# Patient Record
Sex: MALE | Race: WHITE | NOT HISPANIC OR LATINO | URBAN - METROPOLITAN AREA
[De-identification: names, ages, dates, MRNs, and addresses within clinical notes are randomized per-mention and may not be internally consistent; named-entity substitution may affect disease eponyms.]

---

## 2022-05-02 ENCOUNTER — HOSPITAL ENCOUNTER (OUTPATIENT)
Dept: PREADMISSION TESTING | Facility: OTHER | Age: 62
Discharge: HOME | End: 2022-05-02
Attending: ORTHOPAEDIC SURGERY
Payer: COMMERCIAL

## 2022-05-02 ENCOUNTER — ANESTHESIA EVENT (OUTPATIENT)
Dept: SURGERY | Facility: OTHER | Age: 62
End: 2022-05-02
Payer: COMMERCIAL

## 2022-05-02 VITALS
OXYGEN SATURATION: 95 % | WEIGHT: 227 LBS | TEMPERATURE: 98 F | HEART RATE: 62 BPM | DIASTOLIC BLOOD PRESSURE: 74 MMHG | BODY MASS INDEX: 32.5 KG/M2 | HEIGHT: 70 IN | SYSTOLIC BLOOD PRESSURE: 122 MMHG

## 2022-05-02 DIAGNOSIS — Z01.818 PREOP TESTING: Primary | ICD-10-CM

## 2022-05-02 LAB
ANION GAP SERPL CALC-SCNC: 13 MMOL/L (ref 8–16)
BUN SERPL-MCNC: 11 MG/DL (ref 6–20)
CALCIUM SERPL-MCNC: 9.7 MG/DL (ref 8.7–10.5)
CHLORIDE SERPL-SCNC: 102 MMOL/L (ref 95–110)
CO2 SERPL-SCNC: 25 MMOL/L (ref 23–29)
CREAT SERPL-MCNC: 0.9 MG/DL (ref 0.5–1.4)
EST. GFR  (AFRICAN AMERICAN): >60 ML/MIN/1.73 M^2
EST. GFR  (NON AFRICAN AMERICAN): >60 ML/MIN/1.73 M^2
GLUCOSE SERPL-MCNC: 91 MG/DL (ref 70–110)
POTASSIUM SERPL-SCNC: 4.4 MMOL/L (ref 3.5–5.1)
SODIUM SERPL-SCNC: 140 MMOL/L (ref 136–145)

## 2022-05-02 PROCEDURE — 93005 ELECTROCARDIOGRAM TRACING: CPT

## 2022-05-02 PROCEDURE — 80048 BASIC METABOLIC PNL TOTAL CA: CPT | Performed by: ANESTHESIOLOGY

## 2022-05-02 PROCEDURE — 36415 COLL VENOUS BLD VENIPUNCTURE: CPT | Performed by: ANESTHESIOLOGY

## 2022-05-02 PROCEDURE — 93010 ELECTROCARDIOGRAM REPORT: CPT | Mod: ,,, | Performed by: INTERNAL MEDICINE

## 2022-05-02 PROCEDURE — 93010 EKG 12-LEAD: ICD-10-PCS | Mod: ,,, | Performed by: INTERNAL MEDICINE

## 2022-05-02 RX ORDER — ATORVASTATIN CALCIUM 10 MG/1
10 TABLET, FILM COATED ORAL DAILY
COMMUNITY

## 2022-05-02 RX ORDER — ESZOPICLONE 3 MG/1
3 TABLET, FILM COATED ORAL NIGHTLY
COMMUNITY

## 2022-05-02 RX ORDER — SODIUM CHLORIDE, SODIUM LACTATE, POTASSIUM CHLORIDE, CALCIUM CHLORIDE 600; 310; 30; 20 MG/100ML; MG/100ML; MG/100ML; MG/100ML
INJECTION, SOLUTION INTRAVENOUS CONTINUOUS
Status: CANCELLED | OUTPATIENT
Start: 2022-05-02

## 2022-05-02 RX ORDER — LIDOCAINE HYDROCHLORIDE 10 MG/ML
0.5 INJECTION, SOLUTION EPIDURAL; INFILTRATION; INTRACAUDAL; PERINEURAL ONCE
Status: CANCELLED | OUTPATIENT
Start: 2022-05-02 | End: 2022-05-02

## 2022-05-02 RX ORDER — ACETAMINOPHEN 500 MG
1000 TABLET ORAL
Status: CANCELLED | OUTPATIENT
Start: 2022-05-02 | End: 2022-05-02

## 2022-05-02 NOTE — DISCHARGE INSTRUCTIONS
Information to Prepare you for your Surgery    PRE-ADMIT TESTING -  [phone]    [facility]          Your surgery has been scheduled at [facility]. We are pleased to have the opportunity to serve you. For Further Information please call [phone].    On the day of surgery please report to the Information Desk on the 1st floor.    CONTACT YOUR PHYSICIAN'S OFFICE THE DAY PRIOR TO YOUR SURGERY TO OBTAIN YOUR ARRIVAL TIME.     The evening before surgery do not eat anything after 9 p.m. ( this includes hard candy, chewing gum and mints).  You may only have GATORADE, POWERADE AND WATER  from 9 p.m. until you leave your home.   DO NOT DRINK ANY LIQUIDS ON THE WAY TO THE HOSPITAL.      Why does your anesthesiologist allow you to drink Gatorade/Powerade before surgery?  Gatorade/Powerade helps to increase your comfort before surgery and to decrease your nausea after surgery. The carbohydrates in Gatorade/Powerade help reduce your body's stress response to surgery.  If you are a diabetic-drink only water prior to surgery.      Current Visitor policy(12/27/2021) - Patients may have 2 visitors pre and post procedure. Only 2 visitors will be allowed in the Surgical building with the patient.     SPECIAL MEDICATION INSTRUCTIONS: TAKE medications checked off by the Anesthesiologist on your Medication List.    Angiogram Patients: Take medications as instructed by your physician, including aspirin.     Surgery Patients:    If you take ASPIRIN - Your PHYSICIAN/SURGEON will need to inform you IF/OR when you need to stop taking aspirin prior to your surgery.     Do Not take any medications containing IBUPROFEN.    Do Not Wear any make-up (especially eye make-up) to surgery. Please remove any false eyelashes or eyelash extensions. If you arrive the day of surgery with makeup/eyelashes on you will be required to remove prior to surgery. (There is a risk of corneal  abrasions if eye makeup/eyelash extensions are not removed)      Leave all valuables at home.   Do Not wear any jewelry or watches, including any metal in body piercings. Jewelry must be removed prior to coming to the hospital.  There is a possibility that rings that are unable to be removed may be cut off if they are on the surgical extremity.    Please remove all hair extensions, wigs, clips and any other metal accessories/ ornaments from your hair.  These items may pose a flammable/fire risk in Surgery and must be removed.    Do not shave your surgical area at least 5 days prior to your surgery. The surgical prep will be performed at the hospital according to Infection Control regulations.    Contact Lens must be removed before surgery. Either do not wear the contact lens or bring a case and solution for storage.  Please bring a container for eyeglasses or dentures as required.  Bring any paperwork your physician has provided, such as consent forms,  history and physicals, doctor's orders, etc.   Bring comfortable clothes that are loose fitting to wear upon discharge. Take into consideration the type of surgery being performed.  Maintain your diet as advised per your physician the day prior to surgery.      Adequate rest the night before surgery is advised.   Park in the Parking lot behind the hospital or in the [facility] across the street from the parking lot. Parking is complimentary.  If you will be discharged the same day as your procedure, please arrange for a responsible adult to drive you home or to accompany you if traveling by taxi.   YOU WILL NOT BE PERMITTED TO DRIVE OR TO LEAVE THE HOSPITAL ALONE AFTER SURGERY.   If you are being discharged the same day, it is strongly recommended that you arrange for someone to remain with you for the first 24 hrs following your surgery.    The Surgeon will speak to your family/visitor after your surgery regarding the outcome of your surgery and post op  care.  The Surgeon may speak to you after your surgery, but there is a possibility you may not remember the details.  Please check with your family members regarding the conversation with the Surgeon.    We strongly recommend whoever is bringing you home be present for discharge instructions.  This will ensure a thorough understanding for your post op home care.    ALL CHILDREN MUST ALWAYS BE ACCOMPANIED BY AN ADULT.    Visitors-Refer to current Visitor policy handouts.    Thank you for your cooperation.  The Staff of [facility].            Bathing Instructions with Hibiclens    Shower the evening before and morning of your procedure with Hibiclens:  Wash your face with water and your regular face wash/soap  Apply Hibiclens directly on your skin or on a wet washcloth and wash gently. When showering: Move away from the shower stream when applying Hibiclens to avoid rinsing off too soon.  Rinse thoroughly with warm water  Do not dilute Hibiclens        Dry off as usual, do not use any deodorant, powder, body lotions, perfume, after shave or cologne.

## 2022-05-02 NOTE — ANESTHESIA PREPROCEDURE EVALUATION
05/02/2022  Miguel Glover is a 61 y.o., male.      Pre-op Assessment    I have reviewed the Patient Summary Reports.     I have reviewed the Nursing Notes. I have reviewed the NPO Status.   I have reviewed the Medications.     Review of Systems  Anesthesia Hx:  Denies Family Hx of Anesthesia complications.  Personal Hx of Anesthesia complications Slow To Awaken/Delayed Emergence (ICU admit after colonoscopy, had severe bradycardia. Has had pacemaker placed since this episode)   Social:  Non-Smoker    Hematology/Oncology:  Hematology Normal   Oncology Normal   Oncology Comments: Skin CA     Cardiovascular:   Pacemaker Dysrhythmias    Pulmonary:  Pulmonary Normal    Renal/:  Renal/ Normal     Hepatic/GI:  Hepatic/GI Normal    Neurological:   CVA, residual symptoms    Endocrine:  Endocrine Normal        Physical Exam  General: Cooperative, Alert and Oriented    Airway:  Mallampati: II   Mouth Opening: Normal  TM Distance: Normal  Tongue: Normal  Neck ROM: Normal ROM    Dental:  Intact        Anesthesia Plan  Type of Anesthesia, risks & benefits discussed:    Anesthesia Type: Gen ETT  Intra-op Monitoring Plan: Standard ASA Monitors  Post Op Pain Control Plan: peripheral nerve block and multimodal analgesia  Induction:  IV  Airway Plan: Video, Post-Induction  Informed Consent: Informed consent signed with the Patient and all parties understand the risks and agree with anesthesia plan.  All questions answered.   ASA Score: 3  Anesthesia Plan Notes: BMP, EKG  Will have pacer reprogrammed on am of surgery    Ready For Surgery From Anesthesia Perspective.     .

## 2022-05-12 ENCOUNTER — HOSPITAL ENCOUNTER (OUTPATIENT)
Facility: OTHER | Age: 62
Discharge: HOME | End: 2022-05-12
Attending: ORTHOPAEDIC SURGERY | Admitting: ORTHOPAEDIC SURGERY
Payer: COMMERCIAL

## 2022-05-12 ENCOUNTER — ANESTHESIA (OUTPATIENT)
Dept: SURGERY | Facility: OTHER | Age: 62
End: 2022-05-12
Payer: COMMERCIAL

## 2022-05-12 DIAGNOSIS — S46.011A TRAUMATIC COMPLETE TEAR OF RIGHT ROTATOR CUFF, INITIAL ENCOUNTER: ICD-10-CM

## 2022-05-12 DIAGNOSIS — M75.41 IMPINGEMENT SYNDROME OF RIGHT SHOULDER: Primary | ICD-10-CM

## 2022-05-12 PROBLEM — M75.121 COMPLETE TEAR OF RIGHT ROTATOR CUFF: Status: ACTIVE | Noted: 2022-05-12

## 2022-05-12 PROBLEM — S43.431A SLAP LESION OF RIGHT SHOULDER: Status: ACTIVE | Noted: 2022-05-12

## 2022-05-12 PROBLEM — M25.511 RIGHT SHOULDER PAIN: Status: ACTIVE | Noted: 2022-05-12

## 2022-05-12 PROCEDURE — 37000008 HC ANESTHESIA 1ST 15 MINUTES: Performed by: ORTHOPAEDIC SURGERY

## 2022-05-12 PROCEDURE — 25000003 PHARM REV CODE 250: Performed by: ANESTHESIOLOGY

## 2022-05-12 PROCEDURE — 71000033 HC RECOVERY, INTIAL HOUR: Performed by: ORTHOPAEDIC SURGERY

## 2022-05-12 PROCEDURE — 36000710: Performed by: ORTHOPAEDIC SURGERY

## 2022-05-12 PROCEDURE — 71000015 HC POSTOP RECOV 1ST HR: Performed by: ORTHOPAEDIC SURGERY

## 2022-05-12 PROCEDURE — 71000039 HC RECOVERY, EACH ADD'L HOUR: Performed by: ORTHOPAEDIC SURGERY

## 2022-05-12 PROCEDURE — 37000009 HC ANESTHESIA EA ADD 15 MINS: Performed by: ORTHOPAEDIC SURGERY

## 2022-05-12 PROCEDURE — 64415 NJX AA&/STRD BRCH PLXS IMG: CPT | Performed by: ANESTHESIOLOGY

## 2022-05-12 PROCEDURE — 25000003 PHARM REV CODE 250: Performed by: NURSE ANESTHETIST, CERTIFIED REGISTERED

## 2022-05-12 PROCEDURE — 27201423 OPTIME MED/SURG SUP & DEVICES STERILE SUPPLY: Performed by: ORTHOPAEDIC SURGERY

## 2022-05-12 PROCEDURE — 25000003 PHARM REV CODE 250

## 2022-05-12 PROCEDURE — 63600175 PHARM REV CODE 636 W HCPCS: Performed by: ORTHOPAEDIC SURGERY

## 2022-05-12 PROCEDURE — 63600175 PHARM REV CODE 636 W HCPCS: Performed by: NURSE ANESTHETIST, CERTIFIED REGISTERED

## 2022-05-12 PROCEDURE — 63600175 PHARM REV CODE 636 W HCPCS: Performed by: ANESTHESIOLOGY

## 2022-05-12 PROCEDURE — C1713 ANCHOR/SCREW BN/BN,TIS/BN: HCPCS | Performed by: ORTHOPAEDIC SURGERY

## 2022-05-12 PROCEDURE — 25000003 PHARM REV CODE 250: Performed by: ORTHOPAEDIC SURGERY

## 2022-05-12 PROCEDURE — 71000016 HC POSTOP RECOV ADDL HR: Performed by: ORTHOPAEDIC SURGERY

## 2022-05-12 PROCEDURE — 36000711: Performed by: ORTHOPAEDIC SURGERY

## 2022-05-12 DEVICE — ANCHOR BIOCOMP SWVLLOK: Type: IMPLANTABLE DEVICE | Site: SHOULDER | Status: FUNCTIONAL

## 2022-05-12 DEVICE — ANCHOR SUT FIBERTAK 1.8 KNTLS: Type: IMPLANTABLE DEVICE | Site: SHOULDER | Status: FUNCTIONAL

## 2022-05-12 DEVICE — ANCHOR FIBERTAK SOFT 2.6: Type: IMPLANTABLE DEVICE | Site: SHOULDER | Status: FUNCTIONAL

## 2022-05-12 RX ORDER — MIDAZOLAM HYDROCHLORIDE 1 MG/ML
INJECTION INTRAMUSCULAR; INTRAVENOUS
Status: DISCONTINUED | OUTPATIENT
Start: 2022-05-12 | End: 2022-05-12

## 2022-05-12 RX ORDER — DIPHENHYDRAMINE HYDROCHLORIDE 50 MG/ML
25 INJECTION INTRAMUSCULAR; INTRAVENOUS EVERY 6 HOURS PRN
Status: DISCONTINUED | OUTPATIENT
Start: 2022-05-12 | End: 2022-05-12 | Stop reason: HOSPADM

## 2022-05-12 RX ORDER — ROPIVACAINE HYDROCHLORIDE 5 MG/ML
INJECTION, SOLUTION EPIDURAL; INFILTRATION; PERINEURAL
Status: COMPLETED | OUTPATIENT
Start: 2022-05-12 | End: 2022-05-12

## 2022-05-12 RX ORDER — EPINEPHRINE 1 MG/ML
INJECTION, SOLUTION INTRACARDIAC; INTRAMUSCULAR; INTRAVENOUS; SUBCUTANEOUS
Status: DISCONTINUED | OUTPATIENT
Start: 2022-05-12 | End: 2022-05-12 | Stop reason: HOSPADM

## 2022-05-12 RX ORDER — OXYCODONE HYDROCHLORIDE 5 MG/1
5 TABLET ORAL
Status: DISCONTINUED | OUTPATIENT
Start: 2022-05-12 | End: 2022-05-12 | Stop reason: HOSPADM

## 2022-05-12 RX ORDER — CLINDAMYCIN PHOSPHATE 900 MG/50ML
900 INJECTION, SOLUTION INTRAVENOUS ONCE
Status: COMPLETED | OUTPATIENT
Start: 2022-05-12 | End: 2022-05-12

## 2022-05-12 RX ORDER — HYDROCODONE BITARTRATE AND ACETAMINOPHEN 10; 325 MG/1; MG/1
1 TABLET ORAL
Qty: 40 TABLET | Refills: 0 | Status: SHIPPED | OUTPATIENT
Start: 2022-05-12 | End: 2022-09-16 | Stop reason: ALTCHOICE

## 2022-05-12 RX ORDER — MEPERIDINE HYDROCHLORIDE 25 MG/ML
12.5 INJECTION INTRAMUSCULAR; INTRAVENOUS; SUBCUTANEOUS ONCE AS NEEDED
Status: DISCONTINUED | OUTPATIENT
Start: 2022-05-12 | End: 2022-05-12 | Stop reason: HOSPADM

## 2022-05-12 RX ORDER — SODIUM CHLORIDE, SODIUM LACTATE, POTASSIUM CHLORIDE, CALCIUM CHLORIDE 600; 310; 30; 20 MG/100ML; MG/100ML; MG/100ML; MG/100ML
INJECTION, SOLUTION INTRAVENOUS CONTINUOUS
Status: DISCONTINUED | OUTPATIENT
Start: 2022-05-12 | End: 2022-05-12 | Stop reason: HOSPADM

## 2022-05-12 RX ORDER — FENTANYL CITRATE 50 UG/ML
INJECTION, SOLUTION INTRAMUSCULAR; INTRAVENOUS
Status: DISCONTINUED | OUTPATIENT
Start: 2022-05-12 | End: 2022-05-12

## 2022-05-12 RX ORDER — ROCURONIUM BROMIDE 10 MG/ML
INJECTION, SOLUTION INTRAVENOUS
Status: DISCONTINUED | OUTPATIENT
Start: 2022-05-12 | End: 2022-05-12

## 2022-05-12 RX ORDER — LIDOCAINE HYDROCHLORIDE 20 MG/ML
INJECTION INTRAVENOUS
Status: DISCONTINUED | OUTPATIENT
Start: 2022-05-12 | End: 2022-05-12

## 2022-05-12 RX ORDER — HYDROMORPHONE HYDROCHLORIDE 2 MG/ML
0.4 INJECTION, SOLUTION INTRAMUSCULAR; INTRAVENOUS; SUBCUTANEOUS EVERY 5 MIN PRN
Status: DISCONTINUED | OUTPATIENT
Start: 2022-05-12 | End: 2022-05-12 | Stop reason: HOSPADM

## 2022-05-12 RX ORDER — PROPOFOL 10 MG/ML
VIAL (ML) INTRAVENOUS
Status: DISCONTINUED | OUTPATIENT
Start: 2022-05-12 | End: 2022-05-12

## 2022-05-12 RX ORDER — ONDANSETRON 4 MG/1
8 TABLET, ORALLY DISINTEGRATING ORAL EVERY 8 HOURS PRN
Qty: 10 TABLET | Refills: 1 | Status: SHIPPED | OUTPATIENT
Start: 2022-05-12 | End: 2023-05-16 | Stop reason: CLARIF

## 2022-05-12 RX ORDER — PHENYLEPHRINE HYDROCHLORIDE 10 MG/ML
INJECTION INTRAVENOUS
Status: DISCONTINUED | OUTPATIENT
Start: 2022-05-12 | End: 2022-05-12

## 2022-05-12 RX ORDER — SODIUM CHLORIDE 0.9 % (FLUSH) 0.9 %
3 SYRINGE (ML) INJECTION
Status: DISCONTINUED | OUTPATIENT
Start: 2022-05-12 | End: 2022-05-12 | Stop reason: HOSPADM

## 2022-05-12 RX ORDER — ACETAMINOPHEN 500 MG
1000 TABLET ORAL
Status: COMPLETED | OUTPATIENT
Start: 2022-05-12 | End: 2022-05-12

## 2022-05-12 RX ORDER — PROCHLORPERAZINE EDISYLATE 5 MG/ML
5 INJECTION INTRAMUSCULAR; INTRAVENOUS EVERY 30 MIN PRN
Status: DISCONTINUED | OUTPATIENT
Start: 2022-05-12 | End: 2022-05-12 | Stop reason: HOSPADM

## 2022-05-12 RX ORDER — DEXAMETHASONE SODIUM PHOSPHATE 4 MG/ML
INJECTION, SOLUTION INTRA-ARTICULAR; INTRALESIONAL; INTRAMUSCULAR; INTRAVENOUS; SOFT TISSUE
Status: DISCONTINUED | OUTPATIENT
Start: 2022-05-12 | End: 2022-05-12

## 2022-05-12 RX ORDER — ONDANSETRON 2 MG/ML
INJECTION INTRAMUSCULAR; INTRAVENOUS
Status: DISCONTINUED | OUTPATIENT
Start: 2022-05-12 | End: 2022-05-12

## 2022-05-12 RX ORDER — LIDOCAINE HYDROCHLORIDE 10 MG/ML
0.5 INJECTION, SOLUTION EPIDURAL; INFILTRATION; INTRACAUDAL; PERINEURAL ONCE
Status: DISCONTINUED | OUTPATIENT
Start: 2022-05-12 | End: 2022-05-12 | Stop reason: HOSPADM

## 2022-05-12 RX ADMIN — FENTANYL CITRATE 100 MCG: 50 INJECTION, SOLUTION INTRAMUSCULAR; INTRAVENOUS at 06:05

## 2022-05-12 RX ADMIN — LIDOCAINE HYDROCHLORIDE 50 MG: 20 INJECTION, SOLUTION INTRAVENOUS at 06:05

## 2022-05-12 RX ADMIN — PROPOFOL 200 MG: 10 INJECTION, EMULSION INTRAVENOUS at 06:05

## 2022-05-12 RX ADMIN — GLYCOPYRROLATE 0.2 MG: 0.2 INJECTION, SOLUTION INTRAMUSCULAR; INTRAVITREAL at 07:05

## 2022-05-12 RX ADMIN — ROCURONIUM BROMIDE 50 MG: 10 INJECTION, SOLUTION INTRAVENOUS at 06:05

## 2022-05-12 RX ADMIN — CLINDAMYCIN PHOSPHATE 900 MG: 18 INJECTION, SOLUTION INTRAVENOUS at 07:05

## 2022-05-12 RX ADMIN — SODIUM CHLORIDE, SODIUM LACTATE, POTASSIUM CHLORIDE, AND CALCIUM CHLORIDE: 600; 310; 30; 20 INJECTION, SOLUTION INTRAVENOUS at 07:05

## 2022-05-12 RX ADMIN — MIDAZOLAM HYDROCHLORIDE 2 MG: 1 INJECTION, SOLUTION INTRAMUSCULAR; INTRAVENOUS at 06:05

## 2022-05-12 RX ADMIN — PHENYLEPHRINE HYDROCHLORIDE 100 MCG: 10 INJECTION INTRAVENOUS at 07:05

## 2022-05-12 RX ADMIN — ONDANSETRON HYDROCHLORIDE 4 MG: 2 INJECTION INTRAMUSCULAR; INTRAVENOUS at 07:05

## 2022-05-12 RX ADMIN — SUGAMMADEX 200 MG: 100 INJECTION, SOLUTION INTRAVENOUS at 08:05

## 2022-05-12 RX ADMIN — ACETAMINOPHEN 1000 MG: 500 TABLET, FILM COATED ORAL at 06:05

## 2022-05-12 RX ADMIN — DEXAMETHASONE SODIUM PHOSPHATE 8 MG: 4 INJECTION, SOLUTION INTRAMUSCULAR; INTRAVENOUS at 07:05

## 2022-05-12 RX ADMIN — SODIUM CHLORIDE, SODIUM LACTATE, POTASSIUM CHLORIDE, AND CALCIUM CHLORIDE: 600; 310; 30; 20 INJECTION, SOLUTION INTRAVENOUS at 06:05

## 2022-05-12 RX ADMIN — ROPIVACAINE HYDROCHLORIDE 30 ML: 5 INJECTION, SOLUTION EPIDURAL; INFILTRATION; PERINEURAL at 06:05

## 2022-05-12 NOTE — DISCHARGE INSTRUCTIONS
Polar Care Cube Instructions            Shoulder Arthroscopy Post-Op Instructions                                       Dr. Jcarlos Steele    1. Sling/Brace- You should wear the brace until the first post-op visit. You can take the sling off to shower but keep your arm at your side.  Do not lift your arm away from your body unless told otherwise.  I will give you/your family specific instructions about the length of brace wear.    2. Bandage- If you have physical therapy set up they will remove the bandage. Otherwise you can remove it in 3 days. Keep the incisions clean, dry and covered. Do not get it wet until you see me.     3. Ice- Use the polar care as much as you want. Make sure there are clothes or a towel between the cooling unit and your skin.     4. Exercise- If you have PT set up, they will instruct you on exercises to do. If you do not, it is OK to lean your arm over and make circles with your hand pointing to the floor (called Pendulum exercises). Do not lift or grasp anything away from the body until you see me. It is OK to take your arm out of the sling and extend your elbow as long as your elbow is at your side.     5. Medications- You will be given pain and nausea prescriptions to go home. Take the medications as written.  Call the office if you are running low with at least 2-3 days notice to give us time to refill it.  We also recommend taking a baby aspirin for 2 weeks after surgery to decrease the (very,very low) risk of blood clot formation.    6. Bathing- Do not get your shoulder wet until you see me in clinic.    7. Appointment- You should already have your post-op appointment scheduled. If you do not or you can't remember, call the office for more information.

## 2022-05-12 NOTE — TRANSFER OF CARE
"Anesthesia Transfer of Care Note    Patient: Miguel Glover    Procedure(s) Performed: Procedure(s) (LRB):  REPAIR, ROTATOR CUFF, ARTHROSCOPIC (Right)  ARTHROSCOPY, SHOULDER, WITH SUBACROMIAL SPACE DECOMPRESSION (Right)  TENOTOMY, BICEPS, ARTHROSCOPIC (Right)  ARTHROSCOPY, SHOULDER, WITH SLAP REPAIR (Right)    Patient location: PACU    Anesthesia Type: general    Transport from OR: Transported from OR on 6-10 L/min O2 by face mask with adequate spontaneous ventilation    Post pain: adequate analgesia    Post assessment: no apparent anesthetic complications    Post vital signs: stable    Level of consciousness: awake    Nausea/Vomiting: no nausea/vomiting    Complications: none    Transfer of care protocol was followed      Last vitals:   Visit Vitals  /73 (BP Location: Left arm, Patient Position: Lying)   Pulse 78   Temp 36.4 °C (97.6 °F) (Oral)   Resp 16   Ht 5' 10" (1.778 m)   Wt 103 kg (227 lb)   SpO2 96%   BMI 32.57 kg/m²     "

## 2022-05-12 NOTE — ANESTHESIA POSTPROCEDURE EVALUATION
Anesthesia Post Evaluation    Patient: Miguel Glover    Procedure(s) Performed: Procedure(s) (LRB):  REPAIR, ROTATOR CUFF, ARTHROSCOPIC (Right)  ARTHROSCOPY, SHOULDER, WITH SUBACROMIAL SPACE DECOMPRESSION (Right)  TENOTOMY, BICEPS, ARTHROSCOPIC (Right)  ARTHROSCOPY, SHOULDER, WITH SLAP REPAIR (Right)    Final Anesthesia Type: general      Patient location during evaluation: PACU  Patient participation: Yes- Able to Participate  Level of consciousness: awake and alert  Post-procedure vital signs: reviewed and stable  Pain management: adequate  Airway patency: patent    PONV status at discharge: No PONV  Anesthetic complications: no      Cardiovascular status: blood pressure returned to baseline and stable  Respiratory status: room air  Hydration status: euvolemic  Follow-up not needed.          Vitals Value Taken Time   /69 05/12/22 0930   Temp 36.7 °C (98 °F) 05/12/22 0930   Pulse 63 05/12/22 0930   Resp 16 05/12/22 0930   SpO2 94 % 05/12/22 0930         Event Time   Out of Recovery 09:26:00         Pain/Moody Score: Pain Rating Prior to Med Admin: 2 (5/12/2022  6:12 AM)  Moody Score: 10 (5/12/2022  9:30 AM)

## 2022-05-12 NOTE — OR NURSING
VSS on RA. Pacemaker interrogated prior to surgery, post-op interrogation not needed per Bill JAVIER. Pt denies pain. Dressings and PIV C/D/I. Meets Phase I discharge criteria. Ready for transfer to Phase II. Family notified.

## 2022-05-12 NOTE — OP NOTE
[facility]  Surgery Department  Operative Note    SUMMARY     Date of Procedure: 5/12/2022     Procedure:   1. Right shoulder arthroscopic rotator cuff repair  2. Right shoulder arthroscopic SLAP repair  3. Right shoulder arthroscopic biceps tenotomy  4. Right shoulder arthroscopic subacromial decompression (acromioplasty, CA ligament resection, bursectomy, lateral             acromial bevelling)    Surgeon(s) and Role:     * Jcarlos Steele MD - Primary    Assistant: Leonard NORIEGA    Pre-Operative Diagnosis:   1. Right shoulder rotator cuff tear  2. Right shoulder SLAP tear  3. Right shoulder partial-thickness long head biceps tear  4. Right shoulder impingement syndrome    Post-Operative Diagnosis:   Same    Anesthesia: General + regional    Technical Procedures Used: Mr. Glover was taken to the operating room 05/12/2022 for planned right shoulder arthroscopy.  He was given 900 mg of clindamycin IV as well as a regional nerve block preoperatively.  He was brought to the operating room and placed in the supine position.  General tracheal anesthesia was administered.  Examination under anesthesia was performed which revealed no pathologic laxity in full passive motion.  He was then placed in the lateral decubitus position with all bony prominences padded appropriately with an axillary roll.  A shoulder suspension device was used.  The right shoulder was then prepped and draped in the usual sterile fashion and a time-out procedure was performed identifying the right shoulder as the surgical site.  The bony landmarks were marked with a surgical marking pen and 30 cc of normal saline were injected into the glenohumeral joint aid in capsular distention.  Standard posterior portal was then established.  Diagnostic arthroscopy then proceeded.    There is evidence of a type 2 SLAP tear with thickening and low-grade partial-thickness tearing of the long head biceps within the joint.  The subscapularis  was unremarkable.  There is mild synovitis of the rotator interval.  There was no significant cartilage lesions the humeral head and there was a very small grade 2/3 lesion in the posterior central portion of the glenoid.  There was degenerative labral fraying anteriorly posteriorly and inferiorly but no high-grade tear.  There was evidence of partial-thickness tear was some hyperemic changes of the supraspinatus at the insertion.  Appeared to be rather thin but no significant high-grade underside tearing at least.    The shaver was then brought in through an anterior portal established with spinal needle localization into the rotator interval.  This was used to debride the chondral labral junction at the superior labrum.  Next an Arthrex knotless FiberTak anchor was placed at the 12 o'clock position, SutureLasso used for shuttling and the knotless mechanism was deployed without complication getting a nice tension-free repair.    An 18 gauge spinal needle was then placed to the area of maximal pathology of the supraspinatus and a 0 PDS suture was shuttled for later inspection from the bursal side.  Scope was then placed into the subacromial space.    Thickened chronic appearing bursal tissue was encountered.  Through a lateral portal established with spinal needle localization a thorough bursectomy was performed.  The PDS suture was localized and upon probing the probe basically fell into the rotator cuff defect confirming that this was basically a focal full-thickness tear of the central portion of the supraspinatus insertion.  The shaver was used to debride some of the nonviable edges turning this into a small crescent type tear.  The shaver was also used to remove the soft tissue off the insertion site.  A passport cannula was placed a lateral portal and accessory lateral portal was established with spinal needle localization an Arthrex 2.6 mm triple loaded FiberTak anchor was placed at the articular margin.  All  6 sutures were passed in a horizontal mattress fashion with a scorpion suture passing device.  I alternated the camera between the lateral and posterior portals to confirm nice spread of the sutures in the did miss any tissue.  After confirming no additional sutures were needed sliding knots were then tied starting posteriorly and working anteriorly getting a nice tension-free repair.  No dog ears were noted.  Prior to knot tying a microfracture awl was used to create several channels for marrow elements.  A double row construct was then used taking 1 suture from each of the knots and then docking them into the anterior and posterior lateral 4.75 mm SwiveLock anchors.  They cuff laid down nicely with no excessive tension.    Next a subacromial decompression was performed.  Any residual bursal tissue was removed and the undersurface of the acromion was skeletonized.  A type 2 acromion was seen with a hypertrophic CA ligament.  The CA ligament was recessed off the anterior/lateral acromion.  The bur was then used to perform acromioplasty to a type 1 morphology.  A lateral acromial bevelling was also performed to correct pathologic critical shoulder angle.    All excess debris was then removed from the shoulder subacromial space.  The portals were then closed with 3-0 Prolene suture in a soft dressing was applied followed by a polar Care unit and a pillow sling.  The patient was then extubated in the operating room and taken to the PACU without complication.    Description of the Findings of the Procedure:  See dictation    Significant Surgical Tasks Conducted by the Assistant(s), if Applicable:  Positioning, prepping, draping, camera, instrumentation, bandage, closure, brace    Complications: No    Estimated Blood Loss (EBL): 3cc           Implants:   Implant Name Type Inv. Item Serial No.  Lot No. LRB No. Used Action   ANCHOR FIBERTAK SOFT 2.6 - [id number]  ANCHOR FIBERTAK SOFT 2.6  [employer] [id number] Right  1 Implanted   ANCHOR SUT FIBERTAK 1.8 KNTLS - [id number]  ANCHOR SUT FIBERTAK 1.8 KNTLS  [employer] [id number] Right 1 Implanted   ANCHOR BIOCOMP SWVLLOK - [id number]  ANCHOR BIOCOMP SWVLLOK  [employer] [id number] Right 2 Implanted       Specimens:   Specimen (24h ago, onward)            None                  Condition: Good    Disposition: PACU - hemodynamically stable.    Attestation: I was present and scrubbed for the entire procedure.    Discharge Note    SUMMARY     Admit Date: 5/12/2022    Discharge Date and Time:  05/12/2022 8:40 AM    Hospital Course (synopsis of major diagnoses, care, treatment, and services provided during the course of the hospital stay): outpt     Final Diagnosis: Post-Op Diagnosis Codes:     * Right shoulder pain, unspecified chronicity [M25.511]     * Impingement syndrome of right shoulder [M75.41]     * Complete tear of right rotator cuff, unspecified whether traumatic [M75.121]    Disposition: Home or Self Care    Follow Up/Patient Instructions:     Medications:  Reconciled Home Medications:      Medication List      START taking these medications    HYDROcodone-acetaminophen  mg per tablet  Commonly known as: NORCO  Take 1 tablet by mouth every 4 to 6 hours as needed for Pain.     ondansetron 4 MG Tbdl  Commonly known as: ZOFRAN-ODT  Take 2 tablets (8 mg total) by mouth every 8 (eight) hours as needed (nausea).        CONTINUE taking these medications    apixaban 5 mg Tab  Commonly known as: ELIQUIS  Take 5 mg by mouth 2 (two) times daily. Stop 7 days before  surgery     atorvastatin 10 MG tablet  Commonly known as: LIPITOR  Take 10 mg by mouth once daily.     eszopiclone 3 mg Tab  Commonly known as: LUNESTA  Take 3 mg by mouth every evening.     multivitamin capsule  Take 1 capsule by mouth once daily.          Discharge Procedure Orders   Diet general     Call MD for:  temperature >100.4     Call MD for:  persistent nausea and vomiting     Call MD for:  severe uncontrolled pain      Call MD for:  difficulty breathing, headache or visual disturbances     Call MD for:  redness, tenderness, or signs of infection (pain, swelling, redness, odor or green/yellow discharge around incision site)     Call MD for:  hives     Call MD for:  persistent dizziness or light-headedness     Call MD for:  extreme fatigue     Keep surgical extremity elevated     Ice to affected area     Lifting restrictions     Remove dressing in 48 hours   Order Comments: Then change daily.  Keep clean, dry and covered      Follow-up Information     Jcarlos Pearl MD. Schedule an appointment as soon as possible for a visit in 10 day(s).    Specialty: Orthopedic Surgery  Why: For suture removal, For wound re-check  Contact information:  [facility] [facility] [address] [phone]

## 2022-05-12 NOTE — ANESTHESIA PROCEDURE NOTES
Peripheral Block    Patient location during procedure: pre-op   Block not for primary anesthetic.  Reason for block: at surgeon's request and post-op pain management   Post-op Pain Location: rigth shoulder pain   Timeout: 5/12/2022 6:40 AM     Staffing  Authorizing Provider: Akhil Khan MD  Performing Provider: Akhil Khan MD    Preanesthetic Checklist  Completed: patient identified, IV checked, site marked, risks and benefits discussed, surgical consent, monitors and equipment checked, pre-op evaluation and timeout performed  Peripheral Block  Patient position: sitting  Prep: ChloraPrep  Patient monitoring: heart rate, cardiac monitor, continuous pulse ox, continuous capnometry and frequent blood pressure checks  Block type: interscalene  Laterality: right  Injection technique: single shot  Needle  Needle type: Stimuplex   Needle gauge: 22 G  Needle length: 2 in  Needle localization: anatomical landmarks and ultrasound guidance   -ultrasound image captured on disc.  Assessment  Injection assessment: negative aspiration, negative parasthesia and local visualized surrounding nerve  Paresthesia pain: none  Heart rate change: no  Slow fractionated injection: yes  Pain Tolerance: comfortable throughout block and no complaints  Medications:    Medications: ropivacaine (NAROPIN) injection 0.5% - Perineural   30 mL - 5/12/2022 6:40:00 AM    Additional Notes  VSS.  [facility] RN monitoring vitals throughout procedure.  Patient tolerated procedure well.

## 2022-05-13 VITALS
OXYGEN SATURATION: 95 % | HEIGHT: 70 IN | HEART RATE: 66 BPM | WEIGHT: 227 LBS | BODY MASS INDEX: 32.5 KG/M2 | SYSTOLIC BLOOD PRESSURE: 124 MMHG | TEMPERATURE: 98 F | DIASTOLIC BLOOD PRESSURE: 65 MMHG | RESPIRATION RATE: 18 BRPM

## 2022-09-16 ENCOUNTER — OFFICE VISIT (OUTPATIENT)
Dept: UROLOGY | Facility: CLINIC | Age: 62
End: 2022-09-16
Payer: COMMERCIAL

## 2022-09-16 VITALS
RESPIRATION RATE: 18 BRPM | SYSTOLIC BLOOD PRESSURE: 109 MMHG | HEIGHT: 70 IN | WEIGHT: 218 LBS | BODY MASS INDEX: 31.21 KG/M2 | DIASTOLIC BLOOD PRESSURE: 70 MMHG | HEART RATE: 59 BPM

## 2022-09-16 DIAGNOSIS — Z12.5 PROSTATE CANCER SCREENING: ICD-10-CM

## 2022-09-16 DIAGNOSIS — N40.0 BPH WITHOUT OBSTRUCTION/LOWER URINARY TRACT SYMPTOMS: Primary | ICD-10-CM

## 2022-09-16 LAB
BILIRUB SERPL-MCNC: ABNORMAL MG/DL
BLOOD URINE, POC: ABNORMAL
CLARITY, POC UA: CLEAR
COLOR, POC UA: ABNORMAL
GLUCOSE UR QL STRIP: ABNORMAL
KETONES UR QL STRIP: ABNORMAL
LEUKOCYTE ESTERASE URINE, POC: ABNORMAL
NITRITE, POC UA: ABNORMAL
PH, POC UA: 5.5
POC RESIDUAL URINE VOLUME: 0 ML (ref 0–100)
PROTEIN, POC: ABNORMAL
SPECIFIC GRAVITY, POC UA: 1.03
UROBILINOGEN, POC UA: 0.2

## 2022-09-16 PROCEDURE — 51798 US URINE CAPACITY MEASURE: CPT | Mod: S$GLB,,, | Performed by: UROLOGY

## 2022-09-16 PROCEDURE — 51798 POCT BLADDER SCAN: ICD-10-PCS | Mod: S$GLB,,, | Performed by: UROLOGY

## 2022-09-16 PROCEDURE — 81002 POCT URINE DIPSTICK WITHOUT MICROSCOPE: ICD-10-PCS | Mod: S$GLB,,, | Performed by: UROLOGY

## 2022-09-16 PROCEDURE — 81002 URINALYSIS NONAUTO W/O SCOPE: CPT | Mod: S$GLB,,, | Performed by: UROLOGY

## 2022-09-16 PROCEDURE — 99203 OFFICE O/P NEW LOW 30 MIN: CPT | Mod: S$GLB,,, | Performed by: UROLOGY

## 2022-09-16 PROCEDURE — 99203 PR OFFICE/OUTPT VISIT, NEW, LEVL III, 30-44 MIN: ICD-10-PCS | Mod: S$GLB,,, | Performed by: UROLOGY

## 2022-09-16 PROCEDURE — 99999 PR PBB SHADOW E&M-EST. PATIENT-LVL IV: ICD-10-PCS | Mod: PBBFAC,,, | Performed by: UROLOGY

## 2022-09-16 PROCEDURE — 99999 PR PBB SHADOW E&M-EST. PATIENT-LVL IV: CPT | Mod: PBBFAC,,, | Performed by: UROLOGY

## 2022-09-16 RX ORDER — FLUOROURACIL 50 MG/G
CREAM TOPICAL DAILY PRN
COMMUNITY
Start: 2022-05-03

## 2022-09-16 RX ORDER — VALACYCLOVIR HYDROCHLORIDE 500 MG/1
500 TABLET, FILM COATED ORAL DAILY PRN
COMMUNITY
Start: 2022-08-12

## 2022-09-16 RX ORDER — HYDROCODONE BITARTRATE AND ACETAMINOPHEN 5; 325 MG/1; MG/1
1 TABLET ORAL EVERY 6 HOURS PRN
COMMUNITY
Start: 2022-07-01 | End: 2023-05-16 | Stop reason: CLARIF

## 2022-09-16 NOTE — LETTER
September 24, 2022        Jose Borden IV, MD  [address] [facility] [address]             [place] - [facility]  [address] [place] [address] [address]  [facility] [address]  Phone: [phone]  Fax: [phone]   Patient: Miguel Glover   MR Number: [id number]   YOB: 1960   Date of Visit: 9/16/2022       Dear Dr. Borden:    Thank you for referring Miguel Glover to me for evaluation. Attached you will find relevant portions of my assessment and plan of care.    If you have questions, please do not hesitate to call me. I look forward to following Miguel Glover along with you.    Sincerely,      Jcarlos Jolley MD            CC    No Recipients    Enclosure

## 2022-09-16 NOTE — PROGRESS NOTES
[facility] Urology New Patient/H&P:     Miguel Glover is a 61 y.o. male who presents for evaluation of difficulty urinating    AUA symptom score:  19/2 (4: Frequency, intermittency; 3: Emptying, weak stream, straining; 2: Sleeping)  Occasionally hard to get started, and occasionally in the morning will have to go back 30 minutes later and will void again about the same volume as his 1st void.  No significant problems throughout the day  Grandfather had prostate cancer in his 70s  Patient had a stroke at age 34, and is followed by Cardiology.  He has a pacemaker and takes blood thinners and has had issues with bradycardia.  He has had mild memory deficit since his stroke.  Had occasional episodes of dizziness, and adjusted his pacemaker.  Blood pressure runs low at times.  His symptoms do not really bother him.  He wants piece of mind to rule out any prostate cancer.  PSA is 0.56 on 3/15/22.  Postvoid residual is 0 cc by bladder scan  PCP:  Dr. Borden  Cardiologist:  Dr. Sexton  Works in a [email or web address]    Past Medical History:   Diagnosis Date    Pacemaker     Stroke        Past Surgical History:   Procedure Laterality Date    ARTHROSCOPIC REPAIR OF ROTATOR CUFF OF SHOULDER Right 5/12/2022    Procedure: REPAIR, ROTATOR CUFF, ARTHROSCOPIC;  Surgeon: Jcarlos Steele MD;  Location: [facility], [place], operated by [facility] OR;  Service: Orthopedics;  Laterality: Right;    ARTHROSCOPIC TENOTOMY OF BICEPS TENDON Right 5/12/2022    Procedure: TENOTOMY, BICEPS, ARTHROSCOPIC;  Surgeon: Jcarlos Steele MD;  Location: [facility], [place], operated by [facility] OR;  Service: Orthopedics;  Laterality: Right;    ARTHROSCOPY OF SHOULDER WITH DECOMPRESSION OF SUBACROMIAL SPACE Right 5/12/2022    Procedure: ARTHROSCOPY, SHOULDER, WITH SUBACROMIAL SPACE DECOMPRESSION;  Surgeon: Jcarlos Steele MD;  Location: [facility], [place], operated by [facility] OR;  Service: Orthopedics;  Laterality: Right;    HERNIA REPAIR      INSERTION OF PACEMAKER      SHOULDER ARTHROSCOPY W/ SUPERIOR LABRAL ANTERIOR POSTERIOR LESION REPAIR Right 5/12/2022    Procedure:  "ARTHROSCOPY, SHOULDER, WITH SLAP REPAIR;  Surgeon: Jcarlos Steele MD;  Location: [facility];  Service: Orthopedics;  Laterality: Right;       No family history on file.    Social History     Socioeconomic History    Marital status:    Tobacco Use    Smoking status: Never    Smokeless tobacco: Never   Substance and Sexual Activity    Alcohol use: Yes       Review of patient's allergies indicates:  No Known Allergies    Medications Reviewed: see MAR    Focused Physical Exam    Vitals:    09/16/22 0851   BP: 109/70   Pulse: (!) 59   Resp: 18     Body mass index is 31.28 kg/m². Weight: 98.9 kg (218 lb) Height: 5' 10" (177.8 cm)       Abdomen: Soft, non-tender, nondistended, no CVA tenderness  :  normal phallus without plaques/lesions, orthotopic urethral meatus, bilaterally desc testes in normal scrotum without mass or lesion, +L epidid cyst  YUNIOR: normal sphincter tone, no masses, no hemmorrhoids   PROSTATE: 40g, no nodules, non-tender, symmetrical.       LABS:    Recent Results (from the past 336 hour(s))   POCT URINE DIPSTICK WITHOUT MICROSCOPE    Collection Time: 09/16/22  8:54 AM   Result Value Ref Range    Color, UA Dark Yellow     pH, UA 5.5     WBC, UA neg     Nitrite, UA neg     Protein, POC trace     Glucose, UA neg     Ketones, UA neg     Urobilinogen, UA 0.2     Bilirubin, POC small     Blood, UA neg     Clarity, UA Clear     Spec Grav UA 1.030    POCT Bladder Scan    Collection Time: 09/16/22  8:54 AM   Result Value Ref Range    POC Residual Urine Volume 0 0 - 100 mL         Assessment/Diagnosis:    1. BPH without obstruction/lower urinary tract symptoms  POCT URINE DIPSTICK WITHOUT MICROSCOPE    POCT Bladder Scan      2. Prostate cancer screening  PSA, Screening          Plans:  He does have enlarged prostate and moderate LUTS but they are not a significant bother to him.  We did review the natural history of BPH and prostate enlargement and obstructive symptoms with aging, as well as observation, " medical management, and further evaluation for intervention.  He is not feel ready for medical management, and as well did discuss that with his bradycardia, low blood pressure, baseline dizziness and pacemaker, should discuss alpha-blocker use with his cardiologist before starting, but certainly if the symptoms persist despite conservative recommendations, this would be first-line therapy can be discussed at his next visit.  Reassurance provided with normal digital rectal exam and normal PSA no concerns for prostate cancer at this time.  Discussed natural history of prostate cancer, as well as routine prostate cancer screening recommendations at annual YUNIOR and PSA.  Conservative recommendations for urgency frequency provided.  At this time will here to conservative recommendations, and elected six-month follow-up with NP to re-evaluate LUTS and emptying to discuss the possibility of starting medical management verses continuing observation and conservative recommendations.  He will be due for annual PSA at that time so will get PSA prior to that visit.  If he is doing well and stable and no changes are made, can resume annual follow-up.    Level of Medical Decision Making  [ X]  Low: 2 minor/1 stable chronic/1 acute uncomplicated --- review record/result/order test x2  [ _ ]  Mod: 1 chronic exac/2stable chronic/1 acute comp --- review record/result/order test x3 OR independent interp of outside test OR discuss mgmt of outside ordered test --- start drug therapy/plan minor surgery   [ _ ]  High: chronic with exacerbation/progression or trtmnt side effect vs acute/chronic w/ life/body threat ---  (2/3) review record/result/order test x3 OR independent interp of ouutside test OR discuss mgmt of outside ordered test --- drug with monitoring for toxicity, plan major surgery, hospitalize, deescalate/withdraw care bc of prognosis

## 2022-09-21 ENCOUNTER — TELEPHONE (OUTPATIENT)
Dept: UROLOGY | Facility: CLINIC | Age: 62
End: 2022-09-21
Payer: COMMERCIAL

## 2022-09-21 NOTE — TELEPHONE ENCOUNTER
----- Message from Brenna Thomasnington sent at 9/21/2022  3:52 PM CDT -----  Contact: patient  Type:  Patient Returning Call    Who Called:  patient  Who Left Message for Patient:  Yvette  Does the patient know what this is regarding?:    Best Call Back Number:  [phone] (home)   Additional Information:  patient would like to get the medication that he and dr sanders talked about. Please advise.      [facility]/pharmacy #[id number] - [place] - [address] AT [facility]  [address]  [place] [address]  Phone: [phone] Fax: [phone]

## 2022-09-21 NOTE — TELEPHONE ENCOUNTER
Spoke w pt voiced Dr Andujar his cardio agreed for pt to have ED medication will request clearance in writing.

## 2022-09-26 ENCOUNTER — TELEPHONE (OUTPATIENT)
Dept: UROLOGY | Facility: CLINIC | Age: 62
End: 2022-09-26
Payer: COMMERCIAL

## 2022-09-26 NOTE — TELEPHONE ENCOUNTER
----- Message from Anny Chip sent at 9/26/2022  8:52 AM CDT -----  Contact: pt  Type: Needs Medical Advice    Who Called: pt  Best Call Back Number: [phone]    Inquiry/Question: Pt called wanting to know if the ED medication was sent in yet. He called the pharmacy and they don't have it. He states the cardiologist states he can take it.         Thank you~

## 2022-09-27 ENCOUNTER — TELEPHONE (OUTPATIENT)
Dept: UROLOGY | Facility: CLINIC | Age: 62
End: 2022-09-27
Payer: COMMERCIAL

## 2022-09-27 NOTE — TELEPHONE ENCOUNTER
Cardio clearance received from Dr Sexton stating   Pt cleared from cardiac standpoint to begin alpha beta blocker.

## 2022-09-28 NOTE — TELEPHONE ENCOUNTER
Had sent cardiologist note for clearance to use alpha blocker (flomax for example) for his bph If needed given his baseline dizziness, and pacemaker etc.    Patient discussed if cleared to use would start at next follow up if didn't improve with conservative recommendations    Does he want to start bph meds now? Or just note clearance for when he follows up with np, in case is ready to then?

## 2022-09-28 NOTE — TELEPHONE ENCOUNTER
Spoke w pt he voiced that he would like to start meds now   [facility]

## 2022-09-30 ENCOUNTER — OFFICE VISIT (OUTPATIENT)
Dept: URGENT CARE | Facility: CLINIC | Age: 62
End: 2022-09-30
Payer: COMMERCIAL

## 2022-09-30 VITALS
RESPIRATION RATE: 18 BRPM | HEIGHT: 70 IN | TEMPERATURE: 98 F | WEIGHT: 218 LBS | SYSTOLIC BLOOD PRESSURE: 124 MMHG | DIASTOLIC BLOOD PRESSURE: 71 MMHG | OXYGEN SATURATION: 94 % | BODY MASS INDEX: 31.21 KG/M2 | HEART RATE: 66 BPM

## 2022-09-30 DIAGNOSIS — B97.89 ACUTE VIRAL SINUSITIS: Primary | ICD-10-CM

## 2022-09-30 DIAGNOSIS — J01.90 ACUTE VIRAL SINUSITIS: Primary | ICD-10-CM

## 2022-09-30 PROCEDURE — 99203 OFFICE O/P NEW LOW 30 MIN: CPT | Mod: 25,S$GLB,, | Performed by: STUDENT IN AN ORGANIZED HEALTH CARE EDUCATION/TRAINING PROGRAM

## 2022-09-30 PROCEDURE — 99203 PR OFFICE/OUTPT VISIT, NEW, LEVL III, 30-44 MIN: ICD-10-PCS | Mod: 25,S$GLB,, | Performed by: STUDENT IN AN ORGANIZED HEALTH CARE EDUCATION/TRAINING PROGRAM

## 2022-09-30 PROCEDURE — 96372 PR INJECTION,THERAP/PROPH/DIAG2ST, IM OR SUBCUT: ICD-10-PCS | Mod: S$GLB,,, | Performed by: STUDENT IN AN ORGANIZED HEALTH CARE EDUCATION/TRAINING PROGRAM

## 2022-09-30 PROCEDURE — 96372 THER/PROPH/DIAG INJ SC/IM: CPT | Mod: S$GLB,,, | Performed by: STUDENT IN AN ORGANIZED HEALTH CARE EDUCATION/TRAINING PROGRAM

## 2022-09-30 RX ORDER — AMOXICILLIN AND CLAVULANATE POTASSIUM 875; 125 MG/1; MG/1
1 TABLET, FILM COATED ORAL EVERY 12 HOURS
Qty: 20 TABLET | Refills: 0 | Status: SHIPPED | OUTPATIENT
Start: 2022-09-30 | End: 2022-10-10

## 2022-09-30 RX ORDER — DEXAMETHASONE SODIUM PHOSPHATE 4 MG/ML
8 INJECTION, SOLUTION INTRA-ARTICULAR; INTRALESIONAL; INTRAMUSCULAR; INTRAVENOUS; SOFT TISSUE
Status: COMPLETED | OUTPATIENT
Start: 2022-09-30 | End: 2022-09-30

## 2022-09-30 RX ADMIN — DEXAMETHASONE SODIUM PHOSPHATE 8 MG: 4 INJECTION, SOLUTION INTRA-ARTICULAR; INTRALESIONAL; INTRAMUSCULAR; INTRAVENOUS; SOFT TISSUE at 05:09

## 2022-09-30 NOTE — PROGRESS NOTES
"Subjective:       Patient ID: Miguel Glover is a 61 y.o. male.    Vitals:  height is 5' 10" (1.778 m) and weight is 98.9 kg (218 lb). His oral temperature is 97.7 °F (36.5 °C). His blood pressure is 124/71 and his pulse is 66. His respiration is 18 and oxygen saturation is 94% (abnormal).     Chief Complaint: Sinusitis    Patient is a 61-year-old male with a past medical history of BPH, hyperlipidemia, CVA, and vertigo who presents to clinic for evaluation of sinus related issues.  Patient reports he is vaccinated.  Patient denies any recent or known sick exposures.  Patient states over-the-counter Flonase and Zyrtec with some relief of symptoms however this is not resolve his symptoms.  Patient states symptoms worse whenever he goes outside.  Patient believes symptoms are related to his allergies.  Patient states that he does not believes symptoms to be an infection.  Patient requesting a shot.  Patient states that he has experienced nasal sinus congestion with postnasal drainage, and sneezing.  Patient reports mucus to be clear.  Patient states that he has not had any fatigue, fever or chills, headaches or dizziness, generalized body aches, ear pain or sore throat, sinus pain or pressure, chest pain or palpitations, shortness of breath or cough, abdominal pain, nausea or vomiting, diarrhea, rash, or change in mentation.    Sinusitis  This is a new problem. The current episode started in the past 7 days. The problem is unchanged. Associated symptoms include congestion and sneezing. Pertinent negatives include no chills, coughing, diaphoresis, ear pain, headaches, shortness of breath, sinus pressure or sore throat.     Constitution: Negative. Negative for chills, sweating, fatigue and fever.   HENT:  Positive for congestion and postnasal drip. Negative for ear pain, sinus pain, sinus pressure and sore throat.    Neck: neck negative.   Cardiovascular: Negative.  Negative for chest pain and palpitations.   Eyes: " Negative.    Respiratory: Negative.  Negative for chest tightness, cough and shortness of breath.    Gastrointestinal: Negative.  Negative for abdominal pain, nausea, vomiting and diarrhea.   Endocrine: negative.   Genitourinary: Negative.    Musculoskeletal: Negative.  Negative for muscle ache.   Skin: Negative.  Negative for color change, pale and rash.   Allergic/Immunologic: Positive for seasonal allergies and sneezing.   Neurological: Negative.  Negative for dizziness, light-headedness, passing out, headaches, disorientation and altered mental status.   Hematologic/Lymphatic: Negative.    Psychiatric/Behavioral: Negative.  Negative for altered mental status, disorientation and confusion.      Objective:      Physical Exam   Constitutional: He is oriented to person, place, and time. He appears well-developed. He is cooperative.  Non-toxic appearance. He does not appear ill. No distress.   HENT:   Head: Normocephalic and atraumatic.   Ears:   Right Ear: Hearing, tympanic membrane, external ear and ear canal normal.   Left Ear: Hearing, tympanic membrane, external ear and ear canal normal.   Nose: Congestion present. No mucosal edema, rhinorrhea or nasal deformity. No epistaxis. Right sinus exhibits no maxillary sinus tenderness and no frontal sinus tenderness. Left sinus exhibits no maxillary sinus tenderness and no frontal sinus tenderness.   Mouth/Throat: Uvula is midline, oropharynx is clear and moist and mucous membranes are normal. Mucous membranes are moist. No trismus in the jaw. Normal dentition. No uvula swelling. No oropharyngeal exudate or posterior oropharyngeal erythema. Oropharynx is clear.   Eyes: Conjunctivae and lids are normal. Pupils are equal, round, and reactive to light. Right eye exhibits no discharge. Left eye exhibits no discharge. No scleral icterus.   Neck: Trachea normal and phonation normal. Neck supple. No neck rigidity present.   Cardiovascular: Normal rate, regular rhythm and  normal pulses.   Pulmonary/Chest: Effort normal and breath sounds normal. No respiratory distress. He has no wheezes. He has no rhonchi. He has no rales.   Abdominal: Normal appearance and bowel sounds are normal. He exhibits no distension. Soft. There is no abdominal tenderness.   Musculoskeletal: Normal range of motion.         General: No deformity. Normal range of motion.      Cervical back: He exhibits no tenderness.   Lymphadenopathy:     He has no cervical adenopathy.   Neurological: He is alert and oriented to person, place, and time. He exhibits normal muscle tone. Coordination normal.   Skin: Skin is warm, dry, intact, not diaphoretic and not pale. Capillary refill takes 2 to 3 seconds.   Psychiatric: His speech is normal and behavior is normal. Judgment and thought content normal.   Nursing note and vitals reviewed.      Assessment:       1. Acute viral sinusitis          Plan:         Acute viral sinusitis    Other orders  -     dexamethasone injection 8 mg  -     amoxicillin-clavulanate 875-125mg (AUGMENTIN) 875-125 mg per tablet; Take 1 tablet by mouth every 12 (twelve) hours. for 10 days  Dispense: 20 tablet; Refill: 0               Labs:  Patient refused any point of care testing.    Decadron 8 mg IM in clinic.  Patient tolerated well.  No complications noted.    Patient refused need for any antibiotic stating does not believe it is an infection.    Symptomatic treatment at this point.  Recommend continued use of Zyrtec and Flonase as directed.    Tylenol/Motrin per package instructions for any pain or fever.    Recommend nasal saline irrigation and flushing as directed.    Follow-up with PCP in 1-2 days.    Return to clinic as needed.    To ED for any new or acutely worsening symptoms.    Patient in agreement with plan of care.    Prior to patient being discharged he then request antibiotics to be sent in just in case symptoms do not improve with steroid injection alone.    DISCLAIMER: Please note  that my documentation in this Electronic Healthcare Record was produced using speech recognition software and therefore may contain errors related to that software system.These could include grammar, punctuation and spelling errors or the inclusion/exclusion of phrases that were not intended. Garbled syntax, mangled pronouns, and other bizarre constructions may be attributed to that software system.

## 2022-10-02 RX ORDER — TAMSULOSIN HYDROCHLORIDE 0.4 MG/1
0.4 CAPSULE ORAL NIGHTLY
Qty: 30 CAPSULE | Refills: 11 | Status: SHIPPED | OUTPATIENT
Start: 2022-10-02 | End: 2023-09-20 | Stop reason: SDUPTHER

## 2022-10-18 ENCOUNTER — LAB VISIT (OUTPATIENT)
Dept: LAB | Facility: HOSPITAL | Age: 62
End: 2022-10-18
Attending: UROLOGY
Payer: COMMERCIAL

## 2022-10-18 DIAGNOSIS — Z12.5 PROSTATE CANCER SCREENING: ICD-10-CM

## 2022-10-18 LAB — COMPLEXED PSA SERPL-MCNC: 0.41 NG/ML (ref 0–4)

## 2022-10-18 PROCEDURE — 84153 ASSAY OF PSA TOTAL: CPT | Performed by: UROLOGY

## 2022-10-18 PROCEDURE — 36415 COLL VENOUS BLD VENIPUNCTURE: CPT | Mod: PO | Performed by: UROLOGY

## 2022-11-01 ENCOUNTER — TELEPHONE (OUTPATIENT)
Dept: UROLOGY | Facility: CLINIC | Age: 62
End: 2022-11-01
Payer: COMMERCIAL

## 2022-11-01 NOTE — TELEPHONE ENCOUNTER
----- Message from Josie Viveros sent at 11/1/2022 11:15 AM CDT -----  Type:  Test Results    Who Called:  pt  Name of Test (Lab/Mammo/Etc):  Lab  Date of Test:  10/18  Ordering Provider:  Shola  Where the test was performed:  [facility]  Best Call Back Number:  [phone] (home)     Additional Information:  please call and advise--thank you

## 2023-01-21 ENCOUNTER — OFFICE VISIT (OUTPATIENT)
Dept: URGENT CARE | Facility: CLINIC | Age: 63
End: 2023-01-21
Payer: COMMERCIAL

## 2023-01-21 VITALS
RESPIRATION RATE: 18 BRPM | SYSTOLIC BLOOD PRESSURE: 110 MMHG | WEIGHT: 218 LBS | TEMPERATURE: 98 F | OXYGEN SATURATION: 98 % | HEART RATE: 107 BPM | BODY MASS INDEX: 31.21 KG/M2 | DIASTOLIC BLOOD PRESSURE: 71 MMHG | HEIGHT: 70 IN

## 2023-01-21 DIAGNOSIS — H66.92 ACUTE OTITIS MEDIA, LEFT: Primary | ICD-10-CM

## 2023-01-21 DIAGNOSIS — H60.502 ACUTE OTITIS EXTERNA OF LEFT EAR, UNSPECIFIED TYPE: ICD-10-CM

## 2023-01-21 PROCEDURE — 99213 PR OFFICE/OUTPT VISIT, EST, LEVL III, 20-29 MIN: ICD-10-PCS | Mod: S$GLB,,, | Performed by: STUDENT IN AN ORGANIZED HEALTH CARE EDUCATION/TRAINING PROGRAM

## 2023-01-21 PROCEDURE — 99213 OFFICE O/P EST LOW 20 MIN: CPT | Mod: S$GLB,,, | Performed by: STUDENT IN AN ORGANIZED HEALTH CARE EDUCATION/TRAINING PROGRAM

## 2023-01-21 RX ORDER — CIPROFLOXACIN AND DEXAMETHASONE 3; 1 MG/ML; MG/ML
4 SUSPENSION/ DROPS AURICULAR (OTIC) 2 TIMES DAILY
Qty: 7.5 ML | Refills: 0 | Status: SHIPPED | OUTPATIENT
Start: 2023-01-21 | End: 2023-01-28

## 2023-01-21 RX ORDER — AMOXICILLIN AND CLAVULANATE POTASSIUM 875; 125 MG/1; MG/1
1 TABLET, FILM COATED ORAL EVERY 12 HOURS
Qty: 20 TABLET | Refills: 0 | Status: SHIPPED | OUTPATIENT
Start: 2023-01-21 | End: 2023-01-31

## 2023-01-21 NOTE — PROGRESS NOTES
"Subjective:       Patient ID: Miguel Glover is a 62 y.o. male.    Vitals:  height is 5' 10" (1.778 m) and weight is 98.9 kg (218 lb). His oral temperature is 98.1 °F (36.7 °C). His blood pressure is 110/71 and his pulse is 107. His respiration is 18 and oxygen saturation is 98%.     Chief Complaint: Otalgia    Patient is a 62-year-old male with a past medical history of BPH, hyperlipidemia, CVA, and vertigo who presents to clinic for evaluation of ear pain.  Patient reports symptoms x2 weeks.  Patient reports no trauma or injury to the ear.  Patient denies history of ear infections.  Patient denies any fluid in his ears to his knowledge.  Patient reports that he has some leftover antibiotics (unsure of which) at home that he took.  Patient states this helped for a little time however it symptoms return.  Patient states symptoms are isolated to the left ear.  Patient states hurting both inner and outer left ear.  Patient states pain is constant.  Patient states pain is aching and throbbing.  Patient states intermittent sharp pains in the ear.  Patient states that he has experienced muffled hearing from the ear.  Patient states that he has not noticed any drainage or tinnitus from ear.  Patient denies any headaches or dizziness, fever or chills, chest pain or palpitations, shortness of breath or cough, abdominal pain, nausea or vomiting, diarrhea, rash, or change in mentation.    Otalgia   There is pain in the left ear. This is a recurrent problem. The current episode started 1 to 4 weeks ago (2 weeks). The problem occurs constantly. The problem has been unchanged. There has been no fever. The pain is at a severity of 3/10. The pain is mild. Associated symptoms include hearing loss (Left ear). Pertinent negatives include no abdominal pain, coughing, diarrhea, ear discharge, headaches, rash, sore throat or vomiting. He has tried ear drops and antibiotics (sudafed) for the symptoms. The treatment provided moderate " relief.     Constitution: Negative for chills, sweating, fatigue and fever.   HENT:  Positive for ear pain (Left ear) and hearing loss (Left ear). Negative for ear discharge, tinnitus, congestion and sore throat.    Neck: neck negative.   Cardiovascular: Negative.  Negative for chest pain and palpitations.   Eyes: Negative.    Respiratory: Negative.  Negative for chest tightness, cough and shortness of breath.    Gastrointestinal: Negative.  Negative for abdominal pain, nausea, vomiting and diarrhea.   Endocrine: negative.   Genitourinary: Negative.    Musculoskeletal: Negative.    Skin: Negative.  Negative for color change, pale, rash and erythema.   Allergic/Immunologic: Negative.    Neurological: Negative.  Negative for dizziness, light-headedness, passing out, headaches, disorientation and altered mental status.   Hematologic/Lymphatic: Negative.    Psychiatric/Behavioral: Negative.  Negative for altered mental status, disorientation and confusion.      Objective:      Physical Exam   Constitutional: He is oriented to person, place, and time. He appears well-developed. He is cooperative.  Non-toxic appearance. He does not appear ill. No distress.   HENT:   Head: Normocephalic and atraumatic.   Ears:   Right Ear: Hearing, tympanic membrane, external ear and ear canal normal.   Left Ear: External ear and ear canal normal. There is swelling and tenderness. No no drainage. Tympanic membrane is erythematous and bulging. Decreased hearing is noted.   Nose: Nose normal. No mucosal edema, rhinorrhea or nasal deformity. No epistaxis. Right sinus exhibits no maxillary sinus tenderness and no frontal sinus tenderness. Left sinus exhibits no maxillary sinus tenderness and no frontal sinus tenderness.   Mouth/Throat: Uvula is midline, oropharynx is clear and moist and mucous membranes are normal. No trismus in the jaw. Normal dentition. No uvula swelling. No posterior oropharyngeal erythema.   Eyes: Conjunctivae and lids are  normal. Pupils are equal, round, and reactive to light. Right eye exhibits no discharge. Left eye exhibits no discharge. No scleral icterus.   Neck: Trachea normal and phonation normal. Neck supple. No neck rigidity present.   Cardiovascular: Regular rhythm, normal heart sounds and normal pulses. Tachycardia present.   Pulmonary/Chest: Effort normal and breath sounds normal. No respiratory distress. He has no wheezes. He has no rhonchi. He has no rales.   Abdominal: Normal appearance and bowel sounds are normal. He exhibits no distension and no mass. Soft. There is no abdominal tenderness.   Musculoskeletal: Normal range of motion.         General: No deformity. Normal range of motion.      Cervical back: He exhibits no tenderness.   Lymphadenopathy:     He has no cervical adenopathy.   Neurological: He is alert and oriented to person, place, and time. He exhibits normal muscle tone. Coordination normal.   Skin: Skin is warm, dry, intact, not diaphoretic, not pale and no rash. Capillary refill takes less than 2 seconds. No erythema   Psychiatric: His speech is normal and behavior is normal. Judgment and thought content normal.   Nursing note and vitals reviewed.      Assessment:       1. Acute otitis media, left    2. Acute otitis externa of left ear, unspecified type          Plan:         Acute otitis media, left    Acute otitis externa of left ear, unspecified type    Other orders  -     amoxicillin-clavulanate 875-125mg (AUGMENTIN) 875-125 mg per tablet; Take 1 tablet by mouth every 12 (twelve) hours. for 10 days  Dispense: 20 tablet; Refill: 0  -     ciprofloxacin-dexAMETHasone 0.3-0.1% (CIPRODEX) 0.3-0.1 % DrpS; Place 4 drops into the left ear 2 (two) times daily. for 7 days  Dispense: 7.5 mL; Refill: 0                 Take medications as prescribed.    Wet precautions.  Avoid getting left inner ear wet until completion of antibiotics.    Tylenol/Motrin per package instructions for any pain or fever.     Follow-up PCP in 1-2 days.    Follow-up ENT if symptoms not better within 1-2 weeks.    Return to clinic as needed.    To ED for any new or acutely worsening symptoms.    Patient in agreement with plan of care.    DISCLAIMER: Please note that my documentation in this Electronic Healthcare Record was produced using speech recognition software and therefore may contain errors related to that software system.These could include grammar, punctuation and spelling errors or the inclusion/exclusion of phrases that were not intended. Garbled syntax, mangled pronouns, and other bizarre constructions may be attributed to that software system.

## 2023-05-16 ENCOUNTER — HOSPITAL ENCOUNTER (OUTPATIENT)
Dept: PREADMISSION TESTING | Facility: HOSPITAL | Age: 63
Discharge: HOME | End: 2023-05-16
Attending: INTERNAL MEDICINE
Payer: COMMERCIAL

## 2023-05-16 DIAGNOSIS — Z01.810 PREOP CARDIOVASCULAR EXAM: Primary | ICD-10-CM

## 2023-05-16 LAB
ALBUMIN SERPL BCP-MCNC: 4.1 G/DL (ref 3.5–5.2)
ALP SERPL-CCNC: 42 U/L (ref 55–135)
ALT SERPL W/O P-5'-P-CCNC: 20 U/L (ref 10–44)
ANION GAP SERPL CALC-SCNC: 5 MMOL/L (ref 8–16)
AST SERPL-CCNC: 18 U/L (ref 10–40)
BILIRUB SERPL-MCNC: 0.8 MG/DL (ref 0.1–1)
BUN SERPL-MCNC: 13 MG/DL (ref 6–20)
CALCIUM SERPL-MCNC: 9.1 MG/DL (ref 8.7–10.5)
CHLORIDE SERPL-SCNC: 103 MMOL/L (ref 95–110)
CO2 SERPL-SCNC: 26 MMOL/L (ref 23–29)
CREAT SERPL-MCNC: 0.8 MG/DL (ref 0.5–1.4)
ERYTHROCYTE [DISTWIDTH] IN BLOOD BY AUTOMATED COUNT: 13.8 % (ref 11.5–14.5)
EST. GFR  (NO RACE VARIABLE): >60 ML/MIN/1.73 M^2
GLUCOSE SERPL-MCNC: 100 MG/DL (ref 70–110)
HCT VFR BLD AUTO: 41 % (ref 40–54)
HGB BLD-MCNC: 13.8 G/DL (ref 14–18)
MAGNESIUM SERPL-MCNC: 2 MG/DL (ref 1.6–2.6)
MCH RBC QN AUTO: 29.6 PG (ref 27–31)
MCHC RBC AUTO-ENTMCNC: 33.7 G/DL (ref 32–36)
MCV RBC AUTO: 88 FL (ref 82–98)
MRSA SCREEN BY PCR: NEGATIVE
PLATELET # BLD AUTO: 285 K/UL (ref 150–450)
PMV BLD AUTO: 9.5 FL (ref 9.2–12.9)
POTASSIUM SERPL-SCNC: 3.8 MMOL/L (ref 3.5–5.1)
PROT SERPL-MCNC: 7.4 G/DL (ref 6–8.4)
RBC # BLD AUTO: 4.66 M/UL (ref 4.6–6.2)
SODIUM SERPL-SCNC: 134 MMOL/L (ref 136–145)
WBC # BLD AUTO: 6.04 K/UL (ref 3.9–12.7)

## 2023-05-16 PROCEDURE — 87641 MR-STAPH DNA AMP PROBE: CPT | Performed by: INTERNAL MEDICINE

## 2023-05-16 PROCEDURE — 80053 COMPREHEN METABOLIC PANEL: CPT | Performed by: INTERNAL MEDICINE

## 2023-05-16 PROCEDURE — 93010 EKG 12-LEAD: ICD-10-PCS | Mod: ,,, | Performed by: INTERNAL MEDICINE

## 2023-05-16 PROCEDURE — 93005 ELECTROCARDIOGRAM TRACING: CPT | Performed by: INTERNAL MEDICINE

## 2023-05-16 PROCEDURE — 83735 ASSAY OF MAGNESIUM: CPT | Performed by: INTERNAL MEDICINE

## 2023-05-16 PROCEDURE — 93010 ELECTROCARDIOGRAM REPORT: CPT | Mod: ,,, | Performed by: INTERNAL MEDICINE

## 2023-05-16 PROCEDURE — 36415 COLL VENOUS BLD VENIPUNCTURE: CPT | Performed by: INTERNAL MEDICINE

## 2023-05-16 PROCEDURE — 85027 COMPLETE CBC AUTOMATED: CPT | Performed by: INTERNAL MEDICINE

## 2023-05-16 RX ORDER — POTASSIUM CHLORIDE 20 MEQ/1
20 TABLET, EXTENDED RELEASE ORAL
Status: CANCELLED | OUTPATIENT
Start: 2023-05-16

## 2023-05-23 ENCOUNTER — HOSPITAL ENCOUNTER (OUTPATIENT)
Facility: HOSPITAL | Age: 63
Discharge: HOME | End: 2023-05-23
Attending: INTERNAL MEDICINE | Admitting: INTERNAL MEDICINE
Payer: COMMERCIAL

## 2023-05-23 VITALS
WEIGHT: 225 LBS | DIASTOLIC BLOOD PRESSURE: 56 MMHG | BODY MASS INDEX: 32.21 KG/M2 | HEIGHT: 70 IN | SYSTOLIC BLOOD PRESSURE: 96 MMHG | RESPIRATION RATE: 11 BRPM | OXYGEN SATURATION: 97 % | HEART RATE: 65 BPM

## 2023-05-23 DIAGNOSIS — I48.0 PAROXYSMAL ATRIAL FIBRILLATION: ICD-10-CM

## 2023-05-23 DIAGNOSIS — R00.0 SINUS TACHYCARDIA: ICD-10-CM

## 2023-05-23 DIAGNOSIS — I49.5 SSS (SICK SINUS SYNDROME): ICD-10-CM

## 2023-05-23 DIAGNOSIS — I49.9 ARRHYTHMIA: ICD-10-CM

## 2023-05-23 PROCEDURE — 25000003 PHARM REV CODE 250: Performed by: INTERNAL MEDICINE

## 2023-05-23 PROCEDURE — 93005 ELECTROCARDIOGRAM TRACING: CPT | Mod: 59 | Performed by: SPECIALIST

## 2023-05-23 PROCEDURE — C2619 PMKR, DUAL, NON RATE-RESP: HCPCS | Performed by: INTERNAL MEDICINE

## 2023-05-23 PROCEDURE — 96374 THER/PROPH/DIAG INJ IV PUSH: CPT | Mod: 59 | Performed by: INTERNAL MEDICINE

## 2023-05-23 PROCEDURE — 93010 ELECTROCARDIOGRAM REPORT: CPT | Mod: ,,, | Performed by: SPECIALIST

## 2023-05-23 PROCEDURE — 99153 MOD SED SAME PHYS/QHP EA: CPT | Performed by: INTERNAL MEDICINE

## 2023-05-23 PROCEDURE — 99152 MOD SED SAME PHYS/QHP 5/>YRS: CPT | Performed by: INTERNAL MEDICINE

## 2023-05-23 PROCEDURE — 27201423 OPTIME MED/SURG SUP & DEVICES STERILE SUPPLY: Performed by: INTERNAL MEDICINE

## 2023-05-23 PROCEDURE — 93010 EKG 12-LEAD: ICD-10-PCS | Mod: ,,, | Performed by: SPECIALIST

## 2023-05-23 PROCEDURE — 63600175 PHARM REV CODE 636 W HCPCS: Performed by: INTERNAL MEDICINE

## 2023-05-23 PROCEDURE — 33228 REMV&REPLC PM GEN DUAL LEAD: CPT | Performed by: INTERNAL MEDICINE

## 2023-05-23 RX ORDER — POTASSIUM CHLORIDE 20 MEQ/1
20 TABLET, EXTENDED RELEASE ORAL
Status: DISCONTINUED | OUTPATIENT
Start: 2023-05-23 | End: 2023-05-23 | Stop reason: HOSPADM

## 2023-05-23 RX ORDER — LIDOCAINE HYDROCHLORIDE 10 MG/ML
INJECTION INFILTRATION; PERINEURAL
Status: DISCONTINUED | OUTPATIENT
Start: 2023-05-23 | End: 2023-05-23 | Stop reason: HOSPADM

## 2023-05-23 RX ORDER — SODIUM CHLORIDE 450 MG/100ML
INJECTION, SOLUTION INTRAVENOUS CONTINUOUS
Status: DISCONTINUED | OUTPATIENT
Start: 2023-05-23 | End: 2023-05-23 | Stop reason: HOSPADM

## 2023-05-23 RX ORDER — CEFAZOLIN SODIUM 2 G/50ML
2 SOLUTION INTRAVENOUS
Status: DISCONTINUED | OUTPATIENT
Start: 2023-05-23 | End: 2023-05-23 | Stop reason: HOSPADM

## 2023-05-23 RX ORDER — FENTANYL CITRATE 50 UG/ML
INJECTION, SOLUTION INTRAMUSCULAR; INTRAVENOUS
Status: DISCONTINUED | OUTPATIENT
Start: 2023-05-23 | End: 2023-05-23 | Stop reason: HOSPADM

## 2023-05-23 RX ORDER — METOPROLOL TARTRATE 1 MG/ML
2 INJECTION, SOLUTION INTRAVENOUS EVERY 5 MIN PRN
Status: DISCONTINUED | OUTPATIENT
Start: 2023-05-23 | End: 2023-05-23 | Stop reason: HOSPADM

## 2023-05-23 RX ORDER — MIDAZOLAM HYDROCHLORIDE 1 MG/ML
INJECTION INTRAMUSCULAR; INTRAVENOUS
Status: DISCONTINUED | OUTPATIENT
Start: 2023-05-23 | End: 2023-05-23 | Stop reason: HOSPADM

## 2023-05-23 RX ADMIN — METOPROLOL TARTRATE 2 MG: 1 INJECTION, SOLUTION INTRAVENOUS at 09:05

## 2023-05-23 RX ADMIN — POTASSIUM CHLORIDE 20 MEQ: 1500 TABLET, EXTENDED RELEASE ORAL at 06:05

## 2023-05-23 RX ADMIN — SODIUM CHLORIDE: 0.45 INJECTION, SOLUTION INTRAVENOUS at 06:05

## 2023-05-23 RX ADMIN — METOPROLOL TARTRATE 2 MG: 1 INJECTION, SOLUTION INTRAVENOUS at 10:05

## 2023-05-23 NOTE — DISCHARGE INSTRUCTIONS
Post Pacemaker Discharge Instructions:  DO NOT remove the flexan dressing that is covering the pacemaker incision until instructed by  your physician in your follow up appointment (usually around 10 days).  Inspect the site daily for tenderness, discharge, or signs of infection.  Keep dressing dry and intact  Do not apply powders or lotions to incision site until healed  Avoid stress to the incision/suture site. Avoid any kind of trauma to the pacemaker site.  Check your pulse daily and notify your physician if the rate is less than the pacemaker set rate.   Always carry your pacemaker ID card with you. A permanent card will be mailed to you in 6-8 weeks.   Avoid areas of high voltages such as power plants, radio transmitters, or welding equipment. You may walk through anti-theft doorways, but do not stand near them for any length of time.   Inform all Physicians and Dentists that you have a pacemaker.   Your pacemaker will need to be checked for proper functioning at intervals determined by your physician. It is very important to keep these appointments or your pacemaker may not function properly.     Activity:  Limit your activity for the next 48 hours. Avoid excessive bending or squatting.   You may use your arms but avoid reaching overhead for 6 weeks with the arm on the same side as the pacemaker.  Do not lift anything grater than 5 pounds for one month with the arm on the same side as your pacemaker.  No driving until instructed by you physician at your follow up appointment.     CALL YOUR DOCTOR IMMEDIATELY IF YOU EXPERIENCE ANY OF THE FOLLOWING:  Chest pain, palpitations, shortness of breath, excessive dizziness, fainting, nausea or vomiting.   Signs of infection including: swelling, discharge, redness, warmth , pain, fever

## 2023-05-23 NOTE — Clinical Note
The site was marked. The left chest was prepped. The site was prepped with ChloraPrep. The site was clipped. The patient was draped. The patient was positioned supine. The patient was secured using safety straps.

## 2023-05-23 NOTE — Clinical Note
The skin at the left upper chest was sutured closed. Hx of aortic stenosis with porcine valve repair (10 years ago)  - euvolemic on exam  - continue to monitor

## 2023-09-20 ENCOUNTER — LAB VISIT (OUTPATIENT)
Dept: LAB | Facility: HOSPITAL | Age: 63
End: 2023-09-20
Attending: NURSE PRACTITIONER
Payer: COMMERCIAL

## 2023-09-20 ENCOUNTER — OFFICE VISIT (OUTPATIENT)
Dept: UROLOGY | Facility: CLINIC | Age: 63
End: 2023-09-20
Payer: COMMERCIAL

## 2023-09-20 VITALS — BODY MASS INDEX: 32.22 KG/M2 | WEIGHT: 225.06 LBS | HEIGHT: 70 IN | RESPIRATION RATE: 15 BRPM

## 2023-09-20 DIAGNOSIS — N13.8 BPH WITH OBSTRUCTION/LOWER URINARY TRACT SYMPTOMS: Primary | ICD-10-CM

## 2023-09-20 DIAGNOSIS — Z12.5 SCREENING FOR PROSTATE CANCER: ICD-10-CM

## 2023-09-20 DIAGNOSIS — N40.1 BPH WITH OBSTRUCTION/LOWER URINARY TRACT SYMPTOMS: Primary | ICD-10-CM

## 2023-09-20 LAB
COMPLEXED PSA SERPL-MCNC: 0.48 NG/ML (ref 0–4)
POC RESIDUAL URINE VOLUME: 37 ML (ref 0–100)

## 2023-09-20 PROCEDURE — 51798 US URINE CAPACITY MEASURE: CPT | Mod: S$GLB,,, | Performed by: NURSE PRACTITIONER

## 2023-09-20 PROCEDURE — 99214 PR OFFICE/OUTPT VISIT, EST, LEVL IV, 30-39 MIN: ICD-10-PCS | Mod: S$GLB,,, | Performed by: NURSE PRACTITIONER

## 2023-09-20 PROCEDURE — 99999 PR PBB SHADOW E&M-EST. PATIENT-LVL III: CPT | Mod: PBBFAC,,, | Performed by: NURSE PRACTITIONER

## 2023-09-20 PROCEDURE — 99999 PR PBB SHADOW E&M-EST. PATIENT-LVL III: ICD-10-PCS | Mod: PBBFAC,,, | Performed by: NURSE PRACTITIONER

## 2023-09-20 PROCEDURE — 99214 OFFICE O/P EST MOD 30 MIN: CPT | Mod: S$GLB,,, | Performed by: NURSE PRACTITIONER

## 2023-09-20 PROCEDURE — 84153 ASSAY OF PSA TOTAL: CPT | Performed by: NURSE PRACTITIONER

## 2023-09-20 PROCEDURE — 36415 COLL VENOUS BLD VENIPUNCTURE: CPT | Performed by: NURSE PRACTITIONER

## 2023-09-20 PROCEDURE — 51798 POCT BLADDER SCAN: ICD-10-PCS | Mod: S$GLB,,, | Performed by: NURSE PRACTITIONER

## 2023-09-20 RX ORDER — TAMSULOSIN HYDROCHLORIDE 0.4 MG/1
0.8 CAPSULE ORAL NIGHTLY
Qty: 90 CAPSULE | Refills: 3 | Status: SHIPPED | OUTPATIENT
Start: 2023-09-20

## 2023-09-20 RX ORDER — TAMSULOSIN HYDROCHLORIDE 0.4 MG/1
0.4 CAPSULE ORAL NIGHTLY
Qty: 90 CAPSULE | Refills: 3 | Status: SHIPPED | OUTPATIENT
Start: 2023-09-20 | End: 2023-09-20 | Stop reason: SDUPTHER

## 2023-09-20 NOTE — PROGRESS NOTES
CHIEF COMPLAINT:    Mr. Glover is a 62 y.o. male presenting for f/u BPH/LUTS  PRESENTING ILLNESS:    Miguel Glover is a 62 y.o. male who presents for f/u BPH/LUTS. Last clinic visit was 22 with Dr. Jolley    22  AUA symptom score:  19/2 (4: Frequency, intermittency; 3: Emptying, weak stream, straining; 2: Sleeping)  Occasionally hard to get started, and occasionally in the morning will have to go back 30 minutes later and will void again about the same volume as his 1st void.  No significant problems throughout the day  Grandfather had prostate cancer in his 70s  Patient had a stroke at age 34, and is followed by Cardiology.  He has a pacemaker and takes blood thinners and has had issues with bradycardia.  He has had mild memory deficit since his stroke.  Had occasional episodes of dizziness, and adjusted his pacemaker.  Blood pressure runs low at times.  His symptoms do not really bother him.  He wants piece of mind to rule out any prostate cancer.  PSA is 0.56 on 3/15/22.  Postvoid residual is 0 cc by bladder scan  PCP:  Dr. Borden  Cardiologist:  Dr. Sexton  Works in a [employer]    23  AUA SS: Feeling of incomplete Emptyin, Frequency: 3, Intermittency: 3, Urgency: 0, Weak Stream: 2, Strainin, Sleeping: 3, Total SS: 16 , Quality of Life: 2  Denies dysuria, gross hematuria, flank pain, fever, chills, nausea or vomiting  PVR 37 ml  On flomax 0.4 mg daily  10/18/22 PSA 0.41    REVIEW OF SYSTEMS:    Review of Systems    Constitutional: Negative for fever and chills.   HENT: Negative for hearing loss.   Eyes: Negative for visual disturbance.   Respiratory: Negative for shortness of breath.   Cardiovascular: Negative for chest pain.   Gastrointestinal: Negative for nausea, vomiting, and constipation.   Genitourinary: See above  Neurological: Negative for dizziness.   Hematological: Does not bruise/bleed easily.   Psychiatric/Behavioral: Negative for confusion.       PATIENT HISTORY:    Past Medical  History:   Diagnosis Date    Pacemaker     Skin cancer     Stroke        Past Surgical History:   Procedure Laterality Date    ARTHROSCOPIC REPAIR OF ROTATOR CUFF OF SHOULDER Right 5/12/2022    Procedure: REPAIR, ROTATOR CUFF, ARTHROSCOPIC;  Surgeon: Jcarlos Steele MD;  Location: [facility] OR;  Service: Orthopedics;  Laterality: Right;    ARTHROSCOPIC TENOTOMY OF BICEPS TENDON Right 5/12/2022    Procedure: TENOTOMY, BICEPS, ARTHROSCOPIC;  Surgeon: Jcarlos Steele MD;  Location: [facility] OR;  Service: Orthopedics;  Laterality: Right;    ARTHROSCOPY OF SHOULDER WITH DECOMPRESSION OF SUBACROMIAL SPACE Right 5/12/2022    Procedure: ARTHROSCOPY, SHOULDER, WITH SUBACROMIAL SPACE DECOMPRESSION;  Surgeon: Jcarlos Steele MD;  Location: [facility] OR;  Service: Orthopedics;  Laterality: Right;    HERNIA REPAIR      INSERTION OF PACEMAKER      REPLACEMENT OF PACEMAKER GENERATOR N/A 5/23/2023    Procedure: REPLACEMENT, DUAL CHAMBER PACEMAKER GENERATOR;  Surgeon: River Betancourt MD;  Location: [facility] CATH/EP LAB;  Service: Cardiology;  Laterality: N/A;  BIOTRONIC    SHOULDER ARTHROSCOPY W/ SUPERIOR LABRAL ANTERIOR POSTERIOR LESION REPAIR Right 5/12/2022    Procedure: ARTHROSCOPY, SHOULDER, WITH SLAP REPAIR;  Surgeon: Jcarlos Steele MD;  Location: [facility];  Service: Orthopedics;  Laterality: Right;       History reviewed. No pertinent family history.    Social History     Socioeconomic History    Marital status:    Tobacco Use    Smoking status: Never    Smokeless tobacco: Never   Substance and Sexual Activity    Alcohol use: Yes       Allergies:  Patient has no known allergies.    Medications:    Current Outpatient Medications:     apixaban (ELIQUIS) 5 mg Tab, Take 1 tablet (5 mg total) by mouth 2 (two) times daily. Stop 7 days before  surgery, Disp: , Rfl:     atorvastatin (LIPITOR) 10 MG tablet, Take 10 mg by mouth once daily., Disp: , Rfl:     eszopiclone (LUNESTA) 3 mg Tab, Take 3 mg by mouth every evening., Disp: , Rfl:      fluorouraciL (EFUDEX) 5 % cream, Apply topically daily as needed., Disp: , Rfl:     tamsulosin (FLOMAX) 0.4 mg Cap, Take 1 capsule (0.4 mg total) by mouth every evening., Disp: 30 capsule, Rfl: 11    valACYclovir (VALTREX) 500 MG tablet, Take 500 mg by mouth daily as needed., Disp: , Rfl:     PHYSICAL EXAMINATION:    Constitutional: He is oriented to person, place, and time. He appears well-developed and well-nourished.  He is in no apparent distress.    Neck: Normal ROM.     Cardiovascular: Normal rate.      Pulmonary/Chest: Effort normal. No respiratory distress.     Abdominal:  He exhibits no distension.  There is no CVA tenderness.     Neurological: He is alert and oriented to person, place, and time.     Skin: Skin is warm and dry.     Psych: Cooperative with normal affect.    Genitourinary: The prostate is ~40-45 g.  Prostate is smooth, non tender with no nodules noted.    Physical Exam      LABS:    Lab Results   Component Value Date    PSA 0.41 10/18/2022     Lab Results   Component Value Date    CREATININE 0.8 05/16/2023         IMPRESSION:    Encounter Diagnoses   Name Primary?    BPH without obstruction/lower urinary tract symptoms Yes         PLAN:  -Patient is most bothered by feeling if incomplete bladder emptying. Reassured pt he is emptying his bladder based on PVR results  Discussed increasing flomax to 0.8 mg daily. Pt would like try flomax 0.8 mg. If dizziness or low BP occurs, will reduce back to 0.4 mg.   Discussed side effects and indications for flomax. Prescription sent to the pharmacy. Pt verbalized understanding.  -Discussed further work up with cysto/trus to evaluate bladder/prostate. Pt is not interested in surgical intervention for prostate at this time.   -Discussed conservative measures to control urgency and frequency including   1. Avoiding/minimizing bladder irritants (see below), especially in afternoon and evening hours  Discussed bladder irritants include coffe (even decaf), tea,  alcohol, soda, spicy foods, acidic juices (orange, tomato), vinegar, and artificial sweeteners/sugary beverages.  2. timed voiding - empty on a schedule (approx ~2-3 hours) in spite of need to urinate, to get ahead of urge  3. dont postponing voiding - dont hold it on purpose   4. bowel regimen as distended bowel has extrinsic compressive effect on bladder.   - any or all of the following in any combination, titrate to soft daily bowel movement without pushing or straining  - colace/stool softener capsule - once to twice daily  - miralax - 1 capful daily to start, can increase to 2x daily (or decrease to 1/2 cap daily)  - increase dietary fibery  - fiber supplements, such as metamucil  - prunes, prune juice  5. INCREASE water intake  6. Stop fluids 2 hours before bed, and urinate just before bed    -PSA today, will call with results  -RTC 1 year, recall placed    I encouraged him or any of his family members to call or email me with questions and/or concerns.      30 minutes of total time spent on the encounter, which includes face to face time and non-face to face time preparing to see the patient (eg, review of tests), Obtaining and/or reviewing separately obtained history, Documenting clinical information in the electronic or other health record, Independently interpreting results (not separately reported) and communicating results to the patient/family/caregiver, or Care coordination (not separately reported).

## 2024-09-02 DIAGNOSIS — N40.1 BPH WITH OBSTRUCTION/LOWER URINARY TRACT SYMPTOMS: ICD-10-CM

## 2024-09-02 DIAGNOSIS — N13.8 BPH WITH OBSTRUCTION/LOWER URINARY TRACT SYMPTOMS: ICD-10-CM

## 2024-09-03 RX ORDER — TAMSULOSIN HYDROCHLORIDE 0.4 MG/1
2 CAPSULE ORAL NIGHTLY
Qty: 180 CAPSULE | Refills: 0 | Status: SHIPPED | OUTPATIENT
Start: 2024-09-03

## 2024-11-08 NOTE — TELEPHONE ENCOUNTER
Pt voiced his cardiologist is Dr Sexton   Clearance sent to office for verification to start ED med   Pt informed name given last week could not find   Faxed clinic note as well.   spontaneous

## 2024-11-16 ENCOUNTER — OFFICE VISIT (OUTPATIENT)
Dept: URGENT CARE | Facility: CLINIC | Age: 64
End: 2024-11-16
Payer: COMMERCIAL

## 2024-11-16 VITALS
OXYGEN SATURATION: 95 % | SYSTOLIC BLOOD PRESSURE: 110 MMHG | TEMPERATURE: 98 F | HEIGHT: 70 IN | HEART RATE: 72 BPM | WEIGHT: 225 LBS | BODY MASS INDEX: 32.21 KG/M2 | DIASTOLIC BLOOD PRESSURE: 69 MMHG | RESPIRATION RATE: 17 BRPM

## 2024-11-16 DIAGNOSIS — J30.89 ENVIRONMENTAL AND SEASONAL ALLERGIES: Primary | ICD-10-CM

## 2024-11-16 DIAGNOSIS — J34.89 SINUS PRESSURE: ICD-10-CM

## 2024-11-16 PROCEDURE — 99214 OFFICE O/P EST MOD 30 MIN: CPT | Mod: S$GLB,,, | Performed by: NURSE PRACTITIONER

## 2024-11-16 RX ORDER — FLUTICASONE PROPIONATE 50 MCG
2 SPRAY, SUSPENSION (ML) NASAL 2 TIMES DAILY PRN
Qty: 15.8 ML | Refills: 0 | Status: SHIPPED | OUTPATIENT
Start: 2024-11-16

## 2024-11-16 RX ORDER — AZITHROMYCIN 250 MG/1
TABLET, FILM COATED ORAL
Qty: 6 TABLET | Refills: 0 | Status: SHIPPED | OUTPATIENT
Start: 2024-11-16 | End: 2024-11-21

## 2024-11-16 RX ORDER — AZELASTINE 1 MG/ML
1 SPRAY, METERED NASAL 2 TIMES DAILY PRN
Qty: 30 ML | Refills: 0 | Status: SHIPPED | OUTPATIENT
Start: 2024-11-16 | End: 2025-11-16

## 2024-11-16 NOTE — PROGRESS NOTES
"Subjective:      Patient ID: Miguel Glover is a 63 y.o. male.    Vitals:  height is 5' 10" (1.778 m) and weight is 102.1 kg (225 lb). His oral temperature is 97.5 °F (36.4 °C). His blood pressure is 110/69 and his pulse is 72. His respiration is 17 and oxygen saturation is 95%.     Chief Complaint: Sinus Problem  -  The pt is a 64 y/o male that presents to the  rhinorrhea and sinus pressure taking zyrtec. Denies productive nasal phlegm or sputum.  -   Runny nose, ? Fever. Thinks the flu shot he had couple days ago made him sick.     Sinus Problem  This is a new problem. The current episode started in the past 7 days (2 days). The problem is unchanged. There has been no fever. His pain is at a severity of 2/10. The pain is mild. Associated symptoms include coughing, sinus pressure and a sore throat. Treatments tried: zyrtec. The treatment provided no relief.       Constitution: Negative.   HENT:  Positive for sinus pressure and sore throat.    Neck: neck negative.   Cardiovascular: Negative.    Eyes: Negative.    Respiratory:  Positive for cough.    Gastrointestinal: Negative.    Endocrine: negative.   Genitourinary: Negative.    Musculoskeletal: Negative.    Skin: Negative.    Allergic/Immunologic: Negative.    Neurological: Negative.    Hematologic/Lymphatic: Negative.    Psychiatric/Behavioral: Negative.        Objective:     Physical Exam   Constitutional: He is oriented to person, place, and time. He appears well-developed. He does not appear ill. No distress. normal  HENT:   Head: Normocephalic.   Ears:   Right Ear: Hearing, external ear and ear canal normal. A middle ear effusion is present.   Left Ear: Hearing, external ear and ear canal normal. A middle ear effusion is present.   Nose: Rhinorrhea present. Right sinus exhibits frontal sinus tenderness. Right sinus exhibits no maxillary sinus tenderness. Left sinus exhibits frontal sinus tenderness. Left sinus exhibits no maxillary sinus tenderness. "   Mouth/Throat: Uvula is midline, oropharynx is clear and moist and mucous membranes are normal.   Eyes: Conjunctivae are normal.   Cardiovascular: Normal rate, regular rhythm and normal heart sounds.   No murmur heard.  Pulmonary/Chest: Effort normal and breath sounds normal.   Abdominal: Normal appearance.   Musculoskeletal: Normal range of motion.         General: Normal range of motion.   Neurological: He is alert, oriented to person, place, and time and at baseline.   Skin: Skin is warm and dry.   Psychiatric: His behavior is normal. Mood, judgment and thought content normal.   Nursing note and vitals reviewed.      Assessment:     1. Environmental and seasonal allergies    2. Sinus pressure        Plan:   1- flonase and astlin for seasonal allergies  2-zpack for sinus pressure  3- RTC or f/u with PCP if not improved  -    Environmental and seasonal allergies  -     azelastine (ASTELIN) 137 mcg (0.1 %) nasal spray; 1 spray (137 mcg total) by Nasal route 2 (two) times daily as needed for Rhinitis.  Dispense: 30 mL; Refill: 0  -     fluticasone propionate (FLONASE) 50 mcg/actuation nasal spray; 2 sprays (100 mcg total) by Each Nostril route 2 (two) times daily as needed for Rhinitis or Allergies.  Dispense: 15.8 mL; Refill: 0  -     azithromycin (Z-HAZEL) 250 MG tablet; Take 2 tablets by mouth on day 1; Take 1 tablet by mouth on days 2-5  Dispense: 6 tablet; Refill: 0    Sinus pressure  -     azelastine (ASTELIN) 137 mcg (0.1 %) nasal spray; 1 spray (137 mcg total) by Nasal route 2 (two) times daily as needed for Rhinitis.  Dispense: 30 mL; Refill: 0  -     fluticasone propionate (FLONASE) 50 mcg/actuation nasal spray; 2 sprays (100 mcg total) by Each Nostril route 2 (two) times daily as needed for Rhinitis or Allergies.  Dispense: 15.8 mL; Refill: 0  -     azithromycin (Z-HAZEL) 250 MG tablet; Take 2 tablets by mouth on day 1; Take 1 tablet by mouth on days 2-5  Dispense: 6 tablet; Refill: 0

## 2024-12-08 DIAGNOSIS — J30.89 ENVIRONMENTAL AND SEASONAL ALLERGIES: ICD-10-CM

## 2024-12-08 DIAGNOSIS — J34.89 SINUS PRESSURE: ICD-10-CM

## 2024-12-11 RX ORDER — AZELASTINE 1 MG/ML
SPRAY, METERED NASAL
Qty: 90 ML | Refills: 3 | Status: SHIPPED | OUTPATIENT
Start: 2024-12-11

## 2025-03-27 ENCOUNTER — TELEPHONE (OUTPATIENT)
Dept: UROLOGY | Facility: CLINIC | Age: 65
End: 2025-03-27
Payer: COMMERCIAL

## 2025-03-27 NOTE — TELEPHONE ENCOUNTER
----- Message from Katy sent at 3/27/2025  3:50 PM CDT -----  Regarding: self  Who Called: selfHave you contacted your pharmacy:Refill tamsulosin (FLOMAX) 0.4 mg CapRX Name and Strength: pt would like 90 day supplyPreferred Pharmacy with phone number: [facility]/pharmacy #[id number] [place] - [address] AT [facility] [place] [address]Ottui: [phone] Fax: [phone] or Mail Order: localWould the patient rather a call back or a response via [facility]?Best Call Back Number:  [phone] Information:Thank you.  ----- Message -----  From: Katy Benjamin  Sent: 3/27/2025   3:51 PM CDT  To: Diane Jaffe Staff  Subject: self                                             Who Called: selfHave you contacted your pharmacy:Refill tamsulosin (FLOMAX) 0.4 mg CapRX Name and Strength:Preferred Pharmacy with phone number: [facility]/pharmacy #[id number] - [place], [place] - [address] AT [facility] [place] [address]Zcbuc: [phone] Fax: [phone] or Mail Order: localWould the patient rather a call back or a response via [facility]?Best Call Back Number:  [phone] Information:Thank you.

## 2025-03-27 NOTE — TELEPHONE ENCOUNTER
----- Message from Katy sent at 3/27/2025  3:50 PM CDT -----  Regarding: self  Who Called: selfHave you contacted your pharmacy:Refill tamsulosin (FLOMAX) 0.4 mg CapRX Name and Strength: pt would like 90 day supplyPreferred Pharmacy with phone number: [facility]/pharmacy #[id number] [place], [place] - [address] [facility] [place] [address]Sxver: [phone] Fax: [phone] or Mail Order: localWould the patient rather a call back or a response via [facility]?Best Call Back Number:  [phone] Information:Thank you.  ----- Message -----  From: Katy Benjamin  Sent: 3/27/2025   3:51 PM CDT  To: Diane Jaffe Staff  Subject: self                                             Who Called: selfHave you contacted your pharmacy:Refill tamsulosin (FLOMAX) 0.4 mg CapRX Name and Strength:Preferred Pharmacy with phone number: [facility]/pharmacy #[id number] - [place], [place] - [address] [facility] [place] [address]Oxzkx: [phone] Fax: [phone] or Mail Order: localWould the patient rather a call back or a response via [facility]?Best Call Back Number:  [phone] Information:Thank you.

## 2025-03-27 NOTE — TELEPHONE ENCOUNTER
Pt last y anette costello she noted   2023    -PSA today, will call with results  -RTC 1 year, recall placed    Pt needs appt for further refill

## 2025-03-28 ENCOUNTER — TELEPHONE (OUTPATIENT)
Dept: UROLOGY | Facility: CLINIC | Age: 65
End: 2025-03-28
Payer: COMMERCIAL

## 2025-03-28 DIAGNOSIS — N40.1 BPH WITH OBSTRUCTION/LOWER URINARY TRACT SYMPTOMS: ICD-10-CM

## 2025-03-28 DIAGNOSIS — N13.8 BPH WITH OBSTRUCTION/LOWER URINARY TRACT SYMPTOMS: ICD-10-CM

## 2025-03-28 RX ORDER — TAMSULOSIN HYDROCHLORIDE 0.4 MG/1
2 CAPSULE ORAL NIGHTLY
Qty: 180 CAPSULE | OUTPATIENT
Start: 2025-03-28

## 2025-03-28 RX ORDER — TAMSULOSIN HYDROCHLORIDE 0.4 MG/1
2 CAPSULE ORAL NIGHTLY
Qty: 120 CAPSULE | Refills: 0 | Status: SHIPPED | OUTPATIENT
Start: 2025-03-28

## 2025-03-28 NOTE — TELEPHONE ENCOUNTER
Spoke with patient, explained refill was sent to [facility] today. Patient voiced understanding

## 2025-03-28 NOTE — TELEPHONE ENCOUNTER
Called and spoke with [facility] pharmacy, Rx changed to 90 day so insurance will cover

## 2025-03-28 NOTE — TELEPHONE ENCOUNTER
----- Message from Arabella sent at 3/28/2025  2:58 PM CDT -----  Type:  Needs Medical AdviceWho Called: Tomas name and phone #:  [facility]/pharmacy #[id number] - [place], [place] - [address] [facility] [place] [address]Gtbcw: [phone] Fax: [phone] the patient rather a call back or a response via [facility]? Please callBe Call Back Number: [phone] Information: please call pt in regards to medication    tamsulosin (FLOMAX) 0.4 mg Cap he wants to be sure there is no confusion, drug store says ins will only cover 90 day supply Please call back to advise. Thanks!

## 2025-03-28 NOTE — TELEPHONE ENCOUNTER
Copied from CRM #[id number]. Topic: General Inquiry - Return Call  >> Mar 28, 2025 11:07 AM Marjorie wrote:  .Type:  Patient Returning Call    Who Called: Self     Who Left Message for Patient: Alannah     Does the patient know what this is regarding?: yes, he asked why his medicine got denied if he has an appointment scheduled     Would the patient rather a call back or a response via [facility]? Call     Best Call Back Number: .[phone]      Additional Information:  >> Mar 28, 2025 11:28 AM Med Assistant Roseanne wrote:  See last encounter
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
## 2025-05-01 DIAGNOSIS — M25.552 LEFT HIP PAIN: Primary | ICD-10-CM

## 2025-05-02 ENCOUNTER — OFFICE VISIT (OUTPATIENT)
Dept: UROLOGY | Facility: CLINIC | Age: 65
End: 2025-05-02
Payer: COMMERCIAL

## 2025-05-02 VITALS — WEIGHT: 225.06 LBS | HEIGHT: 70 IN | BODY MASS INDEX: 32.22 KG/M2

## 2025-05-02 DIAGNOSIS — N40.1 BPH WITH OBSTRUCTION/LOWER URINARY TRACT SYMPTOMS: Primary | ICD-10-CM

## 2025-05-02 DIAGNOSIS — Z12.5 SCREENING FOR PROSTATE CANCER: ICD-10-CM

## 2025-05-02 DIAGNOSIS — N13.8 BPH WITH OBSTRUCTION/LOWER URINARY TRACT SYMPTOMS: Primary | ICD-10-CM

## 2025-05-02 LAB — POC RESIDUAL URINE VOLUME: 25 ML (ref 0–100)

## 2025-05-02 PROCEDURE — 99999 PR PBB SHADOW E&M-EST. PATIENT-LVL III: CPT | Mod: PBBFAC,,, | Performed by: NURSE PRACTITIONER

## 2025-05-02 RX ORDER — TAMSULOSIN HYDROCHLORIDE 0.4 MG/1
2 CAPSULE ORAL NIGHTLY
Qty: 180 CAPSULE | Refills: 3 | Status: SHIPPED | OUTPATIENT
Start: 2025-05-02

## 2025-05-02 NOTE — PATIENT INSTRUCTIONS
BPH (enlarged prostate) surgery:  Leonardo  Aquablation  TURP    If you would like to proceed with BPH surgery, will need cystoscopy and prostate ultrasound prior to surgery to evaluate bladder and prostate.      Conservative measures to control urgency and frequency including   1. Avoiding/minimizing bladder irritants (see below), especially in afternoon and evening hours    Discussed bladder irritants include coffee (even decaf), tea, alcohol, soda, spicy foods, acidic juices (orange, tomato), vinegar, and artificial sweeteners/sugary beverages.    2. timed voiding - empty on a schedule (approx ~2-3 hours) in spite of need to urinate, to get ahead of urge    3. dont postponing voiding - dont hold it on purpose     4. bowel regimen as distended bowel has extrinsic compressive effect on bladder.   - any or all of the following in any combination, titrate to soft daily bowel movement without pushing or straining  - colace/stool softener capsule - once to twice daily  - miralax - 1 capful daily to start, can increase to 2x daily (or decrease to 1/2 cap daily)  - increase dietary fibery  - fiber supplements, such as metamucil  - prunes, prune juice    5. INCREASE water intake    6. Stop fluids 2 hours before bed, and urinate just before bed

## 2025-05-02 NOTE — PROGRESS NOTES
CHIEF COMPLAINT:    Mr. Glover is a 64 y.o. male presenting for f/u BPH/LUTS  PRESENTING ILLNESS:    Miguel Glover is a 64 y.o. male who presents for f/u BPH/LUTS. Last clinic visit was 23.    22  AUA symptom score:  19/2 (4: Frequency, intermittency; 3: Emptying, weak stream, straining; 2: Sleeping)  Occasionally hard to get started, and occasionally in the morning will have to go back 30 minutes later and will void again about the same volume as his 1st void.  No significant problems throughout the day  Grandfather had prostate cancer in his 70s  Patient had a stroke at age 34, and is followed by Cardiology.  He has a pacemaker and takes blood thinners and has had issues with bradycardia.  He has had mild memory deficit since his stroke.  Had occasional episodes of dizziness, and adjusted his pacemaker.  Blood pressure runs low at times.  His symptoms do not really bother him.  He wants piece of mind to rule out any prostate cancer.  PSA is 0.56 on 3/15/22.  Postvoid residual is 0 cc by bladder scan  PCP:  Dr. Borden  Cardiologist:  Dr. Sexton  Works in [employer]    23  AUA SS: Feeling of incomplete Emptyin, Frequency: 3, Intermittency: 3, Urgency: 0, Weak Stream: 2, Strainin, Sleeping: 3, Total SS: 16 , Quality of Life: 2  Denies dysuria, gross hematuria, flank pain, fever, chills, nausea or vomiting  PVR 37 ml  On flomax 0.4 mg daily  10/18/22 PSA 0.41    25  AUA SS: Feeling of incomplete Emptying: 3, Frequency: 4, Intermittency: 5, Urgency: 0, Weak Stream: 2, Straining: 3, Sleepin, Total SS: 19 , Quality of Life: 2  On flomax 0.8 mg  PVR 25 ml  Denies dysuria, gross hematuria or flank pain  Unsure if had PSA done by PCP    23 PSA 0.48    REVIEW OF SYSTEMS:    Review of Systems    Constitutional: Negative for fever and chills.   Genitourinary: See above  Neurological: Negative for dizziness.   Psychiatric/Behavioral: Negative for confusion.       PATIENT HISTORY:    Past  Medical History:   Diagnosis Date    Pacemaker     Skin cancer     Stroke        Past Surgical History:   Procedure Laterality Date    ARTHROSCOPIC REPAIR OF ROTATOR CUFF OF SHOULDER Right 5/12/2022    Procedure: REPAIR, ROTATOR CUFF, ARTHROSCOPIC;  Surgeon: Jcarlos Steele MD;  Location: [facility];  Service: Orthopedics;  Laterality: Right;    ARTHROSCOPIC TENOTOMY OF BICEPS TENDON Right 5/12/2022    Procedure: TENOTOMY, BICEPS, ARTHROSCOPIC;  Surgeon: Jcarlos Steele MD;  Location: [facility] OR;  Service: Orthopedics;  Laterality: Right;    ARTHROSCOPY OF SHOULDER WITH DECOMPRESSION OF SUBACROMIAL SPACE Right 5/12/2022    Procedure: ARTHROSCOPY, SHOULDER, WITH SUBACROMIAL SPACE DECOMPRESSION;  Surgeon: Jcarlos Steele MD;  Location: [facility] OR;  Service: Orthopedics;  Laterality: Right;    HERNIA REPAIR      INSERTION OF PACEMAKER      REPLACEMENT OF PACEMAKER GENERATOR N/A 5/23/2023    Procedure: REPLACEMENT, DUAL CHAMBER PACEMAKER GENERATOR;  Surgeon: River Betancourt MD;  Location: [facility] CATH/EP LAB;  Service: Cardiology;  Laterality: N/A;  BIOTRONIC    SHOULDER ARTHROSCOPY W/ SUPERIOR LABRAL ANTERIOR POSTERIOR LESION REPAIR Right 5/12/2022    Procedure: ARTHROSCOPY, SHOULDER, WITH SLAP REPAIR;  Surgeon: Jcarlos Steele MD;  Location: [facility];  Service: Orthopedics;  Laterality: Right;       PHYSICAL EXAMINATION:    Constitutional: He is oriented to person, place, and time. He appears well-developed and well-nourished.  He is in no apparent distress.    Abdominal:  He exhibits no distension.  There is no CVA tenderness.     Neurological: He is alert and oriented to person, place, and time.     Psych: Cooperative with normal affect.      Physical Exam      LABS:    Lab Results   Component Value Date    PSA 0.48 09/20/2023    PSA 0.41 10/18/2022     Lab Results   Component Value Date    CREATININE 0.8 05/16/2023       IMPRESSION:    Encounter Diagnoses   Name Primary?    BPH with obstruction/lower urinary tract symptoms  Yes    Screening for prostate cancer        PLAN:  -PSA scheduled. Pt will reach out to PCP and see if done at their office. If so, will have them fax results to Urology clinic and cancel lab appt. Has to be completed within 1 year.    -Discussed BPH / LUTS  Pt is not interested in BPH surgery.   Prefers to continue on flomax 0.8 mg, refilled to pharmacy    -Conservative measures to control urgency and frequency including   1. Avoiding/minimizing bladder irritants (see below), especially in afternoon and evening hours  Discussed bladder irritants include coffee (even decaf), tea, alcohol, soda, spicy foods, acidic juices (orange, tomato), vinegar, and artificial sweeteners/sugary beverages.  2. timed voiding - empty on a schedule (approx ~2-3 hours) in spite of need to urinate, to get ahead of urge  3. dont postponing voiding - dont hold it on purpose   4. bowel regimen as distended bowel has extrinsic compressive effect on bladder.   - any or all of the following in any combination, titrate to soft daily bowel movement without pushing or straining  - colace/stool softener capsule - once to twice daily  - miralax - 1 capful daily to start, can increase to 2x daily (or decrease to 1/2 cap daily)  - increase dietary fibery  - fiber supplements, such as metamucil  - prunes, prune juice  5. INCREASE water intake  6. Stop fluids 2 hours before bed, and urinate just before bed    -RTC 1 year if PSA stable  If LUTS worsens or becomes more bothersome, f/u for sooner appt to plan cysto/TRUS for BPH surgery    I encouraged him or any of his family members to call or email me with questions and/or concerns.      30 minutes of total time spent on the encounter, which includes face to face time and non-face to face time preparing to see the patient (eg, review of tests), Obtaining and/or reviewing separately obtained history, Documenting clinical information in the electronic or other health record, Independently interpreting  results (not separately reported) and communicating results to the patient/family/caregiver, or Care coordination (not separately reported).

## 2025-05-09 ENCOUNTER — RESULTS FOLLOW-UP (OUTPATIENT)
Dept: UROLOGY | Facility: CLINIC | Age: 65
End: 2025-05-09

## 2025-05-09 ENCOUNTER — HOSPITAL ENCOUNTER (OUTPATIENT)
Dept: RADIOLOGY | Facility: HOSPITAL | Age: 65
Discharge: HOME | End: 2025-05-09
Attending: NURSE PRACTITIONER
Payer: COMMERCIAL

## 2025-05-09 DIAGNOSIS — M25.552 LEFT HIP PAIN: ICD-10-CM
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
## 2025-05-09 PROCEDURE — 73721 MRI JNT OF LWR EXTRE W/O DYE: CPT | Mod: TC,LT

## 2025-05-09 PROCEDURE — 73721 MRI JNT OF LWR EXTRE W/O DYE: CPT | Mod: 26,LT,, | Performed by: RADIOLOGY

## 2025-07-02 DIAGNOSIS — M80.00XD AGE-RELATED OSTEOPOROSIS WITH CURRENT PATHOLOGICAL FRACTURE WITH ROUTINE HEALING, SUBSEQUENT ENCOUNTER: Primary | ICD-10-CM

## 2025-08-19 ENCOUNTER — HOSPITAL ENCOUNTER (OUTPATIENT)
Dept: RADIOLOGY | Facility: HOSPITAL | Age: 65
Discharge: HOME | End: 2025-08-19
Payer: COMMERCIAL

## 2025-08-19 DIAGNOSIS — M80.00XD AGE-RELATED OSTEOPOROSIS WITH CURRENT PATHOLOGICAL FRACTURE WITH ROUTINE HEALING, SUBSEQUENT ENCOUNTER: ICD-10-CM

## 2025-08-19 PROCEDURE — 77080 DXA BONE DENSITY AXIAL: CPT | Mod: TC,PO

## 2025-08-19 PROCEDURE — 77080 DXA BONE DENSITY AXIAL: CPT | Mod: 26,,, | Performed by: RADIOLOGY

## 2025-08-25 ENCOUNTER — TELEPHONE (OUTPATIENT)
Dept: UROLOGY | Facility: CLINIC | Age: 65
End: 2025-08-25
Payer: COMMERCIAL

## 2025-08-29 ENCOUNTER — OFFICE VISIT (OUTPATIENT)
Dept: UROLOGY | Facility: CLINIC | Age: 65
End: 2025-08-29
Payer: COMMERCIAL

## 2025-08-29 DIAGNOSIS — N13.8 BPH WITH OBSTRUCTION/LOWER URINARY TRACT SYMPTOMS: Primary | ICD-10-CM

## 2025-08-29 DIAGNOSIS — N40.1 BPH WITH OBSTRUCTION/LOWER URINARY TRACT SYMPTOMS: Primary | ICD-10-CM

## 2025-09-02 ENCOUNTER — TELEPHONE (OUTPATIENT)
Dept: UROLOGY | Facility: CLINIC | Age: 65
End: 2025-09-02
Payer: COMMERCIAL

## 2025-09-02 DIAGNOSIS — N13.8 BPH WITH OBSTRUCTION/LOWER URINARY TRACT SYMPTOMS: Primary | ICD-10-CM

## 2025-09-02 DIAGNOSIS — N40.1 BPH WITH OBSTRUCTION/LOWER URINARY TRACT SYMPTOMS: Primary | ICD-10-CM

## 2025-09-04 ENCOUNTER — TELEPHONE (OUTPATIENT)
Dept: UROLOGY | Facility: CLINIC | Age: 65
End: 2025-09-04
Payer: COMMERCIAL

## (undated) DEVICE — NDL ARTHSCP MF SCORPION

## (undated) DEVICE — KIT TRACTION SHLDR ST DISP LF

## (undated) DEVICE — SOL IRR NACL .9% 3000ML

## (undated) DEVICE — ELECTRODE REM PLYHSV RETURN 9

## (undated) DEVICE — KIT FIBERTAK CURVED SPEAR 1.8M

## (undated) DEVICE — KIT FIBERTAK DISP ANCHOR 2.6

## (undated) DEVICE — TAPE MEDIPORE 3 X 10YD

## (undated) DEVICE — BLADE GATOR 3.5 6 EACH/BOX

## (undated) DEVICE — SPONGE COTTON TRAY 4X4IN

## (undated) DEVICE — APPLICATOR CHLORAPREP ORN 26ML

## (undated) DEVICE — SUT PDS II 0 CT VIL MONO 36

## (undated) DEVICE — CANNULA TWIST IN 7MM X 7CM

## (undated) DEVICE — DRAPE STERI U-SHAPED 47X51IN

## (undated) DEVICE — SUPPORT SLING SHOT II MEDIUM

## (undated) DEVICE — WAND COBLATION TURBOVAC 90

## (undated) DEVICE — DRESSING XEROFORM 1X8IN

## (undated) DEVICE — GLOVE BIOGEL SKINSENSE PI 8.0

## (undated) DEVICE — DRESSING XEROFORM FOIL PK 1X8

## (undated) DEVICE — SUT PROLENE 3-0 FS-1 MONO18

## (undated) DEVICE — PASSER SUTURE LASSO STRGHT 90

## (undated) DEVICE — PAD ABD 8X10 STERILE

## (undated) DEVICE — TAPE ADH MEDIPORE 4 X 10YDS

## (undated) DEVICE — ALCOHOL ISOPROPYL BLU 70% 16OZ

## (undated) DEVICE — GLOVE BIOGEL SKINSENSE PI 7.5

## (undated) DEVICE — POSITIONER IV ARMBOARD FOAM

## (undated) DEVICE — TUBE SET INFLOW/OUTFLOW

## (undated) DEVICE — BLADE SHAVER 4.5 6/BX

## (undated) DEVICE — GAUZE SPONGE 4X4 12PLY

## (undated) DEVICE — SET EXTENSION 30 IN W/LL ROLLE

## (undated) DEVICE — CANNULA PASSPORT 8 MM X 4CM.

## (undated) DEVICE — SEE L#120831

## (undated) DEVICE — PAD SHOULDER CARE POLAR

## (undated) DEVICE — Device

## (undated) DEVICE — DRESSING AQUACEL AG W/SILVER 3.5X6

## (undated) DEVICE — UNDERGLOVES BIOGEL PI SIZE 8